# Patient Record
Sex: MALE | Race: BLACK OR AFRICAN AMERICAN | Employment: STUDENT | URBAN - METROPOLITAN AREA
[De-identification: names, ages, dates, MRNs, and addresses within clinical notes are randomized per-mention and may not be internally consistent; named-entity substitution may affect disease eponyms.]

---

## 2017-04-06 ENCOUNTER — OFFICE VISIT (OUTPATIENT)
Dept: ORTHOPEDIC SURGERY | Age: 22
End: 2017-04-06

## 2017-04-06 VITALS
HEART RATE: 79 BPM | TEMPERATURE: 97 F | HEIGHT: 74 IN | SYSTOLIC BLOOD PRESSURE: 147 MMHG | DIASTOLIC BLOOD PRESSURE: 80 MMHG

## 2017-04-06 DIAGNOSIS — S86.011A ACHILLES TENDON RUPTURE, RIGHT, INITIAL ENCOUNTER: Primary | ICD-10-CM

## 2017-04-06 NOTE — PROGRESS NOTES
Patient states he was playing basketball and felt a pop. He presents today withx-rays from [facility].

## 2017-04-06 NOTE — PATIENT INSTRUCTIONS
Follow up after MRI   Discussed surgery  Placed into a short       Achilles Tendon Repair: Before Your Surgery  What is an Achilles tendon repair? Achilles tendon repair reconnects the ends of the broken tendon. This lets you use your foot again in a normal way. You could have one of two types of surgery. In open surgery, the doctor makes a cut at the back of your leg. This cut is called an incision. In percutaneous (say \"per-colten-LUPE-ferdinand-us\") surgery, the doctor uses a few smaller cuts. Tools for fixing the tendon are inserted through the cuts. Your doctor will decide which surgery will work best for you. Most people go home the same day as the surgery. How soon you can go back to work and your normal routine depends on your job. If you sit at work, you may be able to go back in 1 to 2 weeks. But if you are on your feet at work, it may take 6 to 8 weeks. If you are very physically active in your job, it may take 3 to 6 months. You will be in a cast or walking boot for 6 to 12 weeks after surgery. You may need physical therapy. Most people can return to sports in 4 to 6 months. Follow-up care is a key part of your treatment and safety. Be sure to make and go to all appointments, and call your doctor if you are having problems. It's also a good idea to know your test results and keep a list of the medicines you take. What happens before surgery? Surgery can be stressful. This information will help you understand what you can expect. And it will help you safely prepare for surgery. Preparing for surgery  · Understand exactly what surgery is planned, along with the risks, benefits, and other options. · Tell your doctors ALL the medicines, vitamins, supplements, and herbal remedies you take. Some of these can increase the risk of bleeding or interact with anesthesia. · If you take blood thinners, such as warfarin (Coumadin), clopidogrel (Plavix), or aspirin, be sure to talk to your doctor.  He or she will tell you if you should stop taking these medicines before your surgery. Make sure that you understand exactly what your doctor wants you to do. · Your doctor will tell you which medicines to take or stop before your surgery. You may need to stop taking certain medicines a week or more before surgery. So talk to your doctor as soon as you can. · If you have an advance directive, let your doctor know. It may include a living will and a durable power of  for health care. Bring a copy to the hospital. If you don't have one, you may want to prepare one. It lets your doctor and loved ones know your health care wishes. Doctors advise that everyone prepare these papers before any type of surgery or procedure. What happens on the day of surgery? · Follow the instructions exactly about when to stop eating and drinking. If you don't, your surgery may be canceled. If your doctor told you to take your medicines on the day of surgery, take them with only a sip of water. · Take a bath or shower before you come in for your surgery. Do not apply lotions, perfumes, deodorants, or nail polish. · Do not shave the surgical site yourself. · Take off all jewelry and piercings. And take out contact lenses, if you wear them. At the hospital or surgery center  · Bring a picture ID. · The area for surgery is often marked to make sure there are no errors. · You will be kept comfortable and safe by your anesthesia provider. The anesthesia may make you sleep. Or it may just numb the area being worked on. · The surgery will take 1 to 2 hours. · Your leg will be bandaged and raised. You may have a drain near your incision. · Your foot and ankle will be held in a cast or walking boot to limit motion. Going home  · Be sure you have someone to drive you home. Anesthesia and pain medicine make it unsafe for you to drive. · You will be given more specific instructions about recovering from your surgery.  They will cover things like diet, wound care, follow-up care, driving, and getting back to your normal routine. When should you call your doctor? · You have questions or concerns. · You don't understand how to prepare for your surgery. · You become ill before the surgery (such as fever, flu, or a cold). · You need to reschedule or have changed your mind about having the surgery. Where can you learn more? Go to http://linh-nuha.info/. Enter M732 in the search box to learn more about \"Achilles Tendon Repair: Before Your Surgery. \"  Current as of: May 23, 2016  Content Version: 11.2  © 4433-4855 [employer]. Care instructions adapted under license by [employer] (which disclaims liability or warranty for this information). If you have questions about a medical condition or this instruction, always ask your healthcare professional. [address] any warranty or liability for your use of this information.

## 2017-04-06 NOTE — PROGRESS NOTES
AMBULATORY PROGRESS NOTE      Patient: Kirby Singletary             MRN: [id number]     SSN: [id number] There is no height or weight on file to calculate BMI. YOB: 1995     AGE: 25 y.o. EX: male    PCP: Jakob Ko, DO     IMPRESSION/DIAGNOSIS /// TREATMENT PLAN /// HPI AND EXAMINATION       DIAGNOSES  1. Achilles tendon rupture, right, initial encounter        Orders Placed This Encounter    MRI ANKLE RT WO CONT    MRI TIB/FIB RT W CONT      Chrischang Martinez understands his diagnoses and the proposed plan. My overall impression is such that the patient is a 72-year-old male who was playing basketball and has been pain and discomfort to his right hindfoot along the Achilles tendon. My impression is a right Achilles tendon rupture. PLAN:   1. MRI of his right Achilles tendon. 2. He is placed in a well padded posterior splint in plantarflexion. 3. He already had prescriptions for Motrin, as well as Norco as given to him by the ER staff at [facility], where he went to a [facility] facility yesterday, April 5, 2017. I want to see him after the MRI. I did discuss with him and his mom and dad that my impression is that he has an acute rupture of the right Achilles tendon complex. My recommendation would be for surgery, repair of the right Achilles tendon complex. We will see him early next week and assess his swelling but also talk to him further about surgery. I did talk to him in detail really about this surgery, primary repair of the Achilles tendon, and that he will be in plantarflexion for at least four to six weeks then a CAM walker boot in plantarflexion for another four weeks with some gradual range of motion and supervised physical therapy. He wants to go into the [facility] at some point. He, unfortunately, is going to have to delay this for at least six months to one year to allow him to recover from this surgery.   I did talk to them about DVT and clots that can occur after any injury to the lower extremity. After surgery, I anticipate putting him on Aspirin 325 mg one po each day. Lindsay Martinez IS A 25 y.o. male who presents to my outpatient office for evaluation of: The patient, Bonnie Martinez, is a 25 y.o. male who was playing basketball yesterday, date of injury, April 5, 2017. He was doing [employer]". What he means, he was dribbling the ball and getting ready to run and felt a pop to his right hindfoot. He had pain and discomfort and had difficulty ambulating. He went to a [facility] facility where he had x-rays and an examination. Examination clinically indicates that he has an Achilles tendon rupture, is what the emergency room staff suspected and is what I confirmed also on my examination. He arrives here in a posterior splint. His chief complaint is right hindfoot pain and the inability to weightbear due to having pain along the Achilles tendon complex. He is otherwise healthy. He has no history of Fluoroquinolone exposure. He has no other medical problems. ANKLE/FOOT right    Psychiatry: Alert, Oriented x 3; Speech normal in context and clarity,            Memory intact grossly, no involuntary movements - tremors, no dementia  Gait: uses assistive device  and nonambulatory  Tenderness: moderate tenderness achilles tendon with palpable tendinopathy, Positive Llamas's Sign  Cutaneous: mild swelling, no bruising,  Palpable void to achilles tendon present  Joint Motion: Decreased Ankle DF/PF ROM due to tenderness at achilles  Joint / Tendon Stability: Not tested or conducted due to tenderness   Alignment: neutral Hindfoot, none Metatarsus Adductus Metatarsus. Neuro Motor/Sensory: NL/NL, Vascular: NL foot/ankle pulses  Lymphatics: No extremity lymphedema,              No proximal calf swelling, no tenderness to calf muscles. CHART REVIEW     History reviewed. No pertinent past medical history.   Current Outpatient Prescriptions Medication Sig    acetaminophen (TYLENOL) 325 mg tablet Take  by mouth every four (4) hours as needed for Pain. No current facility-administered medications for this visit. No Known Allergies  Past Surgical History:   Procedure Laterality Date    HX ORTHOPAEDIC      finger    HX TONSIL AND ADENOIDECTOMY  2004     Social History     Occupational History    Not on file. Social History Main Topics    Smoking status: Never Smoker    Smokeless tobacco: Not on file    Alcohol use No    Drug use: Not on file    Sexual activity: Not on file     History reviewed. No pertinent family history. REVIEW OF SYSTEMS : 4/6/2017  ALL BELOW ARE Negative except : SEE HPI     Constitutional: Negative for fever, chills and weight loss. Neg Weight Loss  Cardiovascular: Negative for chest pain, claudication and leg swelling. SOB, CARRASCO   Gastrointestinal/Urological: Negative for pain, N/V/D/C, Blood in stool or urine,dysuria,  Hematuria, Incontinence  Musculoskeletal: see HPI. Neurological: Negative for dizziness and weakness, headaches,Visual Changes or   Confusion, or Seizures,   Psychiatric/Behavioral: Negative for depression, memory loss and substance abuse. Extremities:  Negative for hair changes, rash or skin lesion changes. Hematologic: Negative for Bleeding problems, bruising, pallor or swollen lymph nodes.   Peripheral Vascular: No calf pain, vascular vein tenderness to calf pain              No calf throbbing, posterior knee throbbing pain     DIAGNOSTIC IMAGING  [facility] : INTERPRETATION BY RADIOLOGIST  4/5/2016     ANKLE COMPLETE RT (04/06/2017 12:32 AM)  ANKLE COMPLETE RT (04/06/2017 12:32 AM)   Impressions   Impression:    --------------        No acute osseous abnormality.        Soft defect along the distal Achilles tendon possibly Achilles tendon injury/rupture.       ANKLE COMPLETE RT (04/06/2017 12:32 AM)   Narrative ---------------------------------------------------------------------------    <<<<<<<<<           [facility]           >>>>>>>>>     ---------------------------------------------------------------------------        CLINICAL HISTORY:  Trauma. Pain.        COMPARISON EXAMINATIONS:  None.            ---  THREE VIEWS OF THE RIGHT ANKLE ---        No fracture identified.  The ankle mortise is symmetric.        There is a soft tissue defect along the distal Achilles tendon.            --------------       ANKLE COMPLETE RT (04/06/2017 12:32 AM)   Procedure Note   Interface, Talkstation Rad Res - u Apr 6, 2017 12:53 AM EDT    ---------------------------------------------------------------------------  <<<<<<<<< [facility] >>>>>>>>>   ---------------------------------------------------------------------------    CLINICAL HISTORY: Trauma. Pain. COMPARISON EXAMINATIONS: None. --- THREE VIEWS OF THE RIGHT ANKLE ---    No fracture identified. The ankle mortise is symmetric. There is a soft tissue defect along the distal Achilles tendon. --------------  IMPRESSION  Impression:  --------------    No acute osseous abnormality. Soft defect along the distal Achilles tendon possibly Achilles tendon injury/rupture.                 Please see above section of this report. I have personally reviewed the results of the above study. The interpretation of this study is my professional opinion.       Anu Maurice MD  4/6/2017  1:12 PM

## 2017-04-06 NOTE — MR AVS SNAPSHOT
Visit Information Date & Time Provider Department Dept. Phone Encounter #  
 4/6/2017  1:10 PM Jose Elias Jacob MD [facility] [phone] Upcoming Health Maintenance Date Due Hepatitis A Peds Age 1-18 (1 of 2 - Standard Series) 10/2/1998 DTaP/Tdap/Td series (1 - Tdap) 10/2/2004 HPV AGE 9Y-34Y (1 of 3 - Male 3 Dose Series) 10/2/2008 INFLUENZA AGE 9 TO ADULT 8/1/2016 Allergies as of 4/6/2017  Review Complete On: 4/6/2017 By: Albaro Mcpherson No Known Allergies Current Immunizations  Never Reviewed No immunizations on file. Not reviewed this visit You Were Diagnosed With   
  
 Codes Comments Achilles tendon rupture, right, initial encounter    -  Primary ICD-10-CM: G66.255F ICD-9-CM: 845.09 Vitals BP Pulse Temp Height(growth percentile) Smoking Status 147/80 (98 %/ 53 %)* 79 97 °F (36.1 °C) (Oral) 6' 2\" (1.88 m) (94 %, Z= 1.58) Never Smoker *BP percentiles are based on NHBPEP's 4th Report Growth percentiles are based on CDC 2-20 Years data. Your Updated Medication List  
  
   
This list is accurate as of: 4/6/17  2:01 PM.  Always use your most recent med list.  
  
  
  
  
 TYLENOL 325 mg tablet Generic drug:  acetaminophen Take  by mouth every four (4) hours as needed for Pain. To-Do List   
 04/06/2017 Imaging:  MRI ANKLE RT WO CONT   
  
 04/06/2017 Imaging:  MRI HIP  RT  WO CONT Referral Information Referral ID Referred By Referred To  
  
 [id number] FRANKIE PAYNE Not Available Visits Status Start Date End Date 1 New Request 4/6/17 4/6/18 If your referral has a status of pending review or denied, additional information will be sent to support the outcome of this decision. Referral ID Referred By Referred To  
 [id number] FRANKIE PAYNE Not Available Visits Status Start Date End Date 1 New Request 4/6/17 4/6/18 If your referral has a status of pending review or denied, additional information will be sent to support the outcome of this decision. Patient Instructions Follow up after MRI Discussed surgery Placed into a short Achilles Tendon Repair: Before Your Surgery What is an Achilles tendon repair? Achilles tendon repair reconnects the ends of the broken tendon. This lets you use your foot again in a normal way. You could have one of two types of surgery. In open surgery, the doctor makes a cut at the back of your leg. This cut is called an incision. In percutaneous (say \"per-kew-LUPE-nee-us\") surgery, the doctor uses a few smaller cuts. Tools for fixing the tendon are inserted through the cuts. Your doctor will decide which surgery will work best for you. Most people go home the same day as the surgery. How soon you can go back to work and your normal routine depends on your job. If you sit at work, you may be able to go back in 1 to 2 weeks. But if you are on your feet at work, it may take 6 to 8 weeks. If you are very physically active in your job, it may take 3 to 6 months. You will be in a cast or walking boot for 6 to 12 weeks after surgery. You may need physical therapy. Most people can return to sports in 4 to 6 months. Follow-up care is a key part of your treatment and safety. Be sure to make and go to all appointments, and call your doctor if you are having problems. It's also a good idea to know your test results and keep a list of the medicines you take. What happens before surgery? Surgery can be stressful. This information will help you understand what you can expect. And it will help you safely prepare for surgery. Preparing for surgery · Understand exactly what surgery is planned, along with the risks, benefits, and other options.  
· Tell your doctors ALL the medicines, vitamins, supplements, and herbal remedies you take. Some of these can increase the risk of bleeding or interact with anesthesia. · If you take blood thinners, such as warfarin (Coumadin), clopidogrel (Plavix), or aspirin, be sure to talk to your doctor. He or she will tell you if you should stop taking these medicines before your surgery. Make sure that you understand exactly what your doctor wants you to do. · Your doctor will tell you which medicines to take or stop before your surgery. You may need to stop taking certain medicines a week or more before surgery. So talk to your doctor as soon as you can. · If you have an advance directive, let your doctor know. It may include a living will and a durable power of  for health care. Bring a copy to the hospital. If you don't have one, you may want to prepare one. It lets your doctor and loved ones know your health care wishes. Doctors advise that everyone prepare these papers before any type of surgery or procedure. What happens on the day of surgery? · Follow the instructions exactly about when to stop eating and drinking. If you don't, your surgery may be canceled. If your doctor told you to take your medicines on the day of surgery, take them with only a sip of water. · Take a bath or shower before you come in for your surgery. Do not apply lotions, perfumes, deodorants, or nail polish. · Do not shave the surgical site yourself. · Take off all jewelry and piercings. And take out contact lenses, if you wear them. At the hospital or surgery center · Bring a picture ID. · The area for surgery is often marked to make sure there are no errors. · You will be kept comfortable and safe by your anesthesia provider. The anesthesia may make you sleep. Or it may just numb the area being worked on. · The surgery will take 1 to 2 hours. · Your leg will be bandaged and raised. You may have a drain near your incision.  
· Your foot and ankle will be held in a cast or walking boot to limit motion. Going home · Be sure you have someone to drive you home. Anesthesia and pain medicine make it unsafe for you to drive. · You will be given more specific instructions about recovering from your surgery. They will cover things like diet, wound care, follow-up care, driving, and getting back to your normal routine. When should you call your doctor? · You have questions or concerns. · You don't understand how to prepare for your surgery. · You become ill before the surgery (such as fever, flu, or a cold). · You need to reschedule or have changed your mind about having the surgery. Where can you learn more? Go to http://linhArtVentive Medical Groupnuha.info/. Enter M732 in the search box to learn more about \"Achilles Tendon Repair: Before Your Surgery. \" Current as of: May 23, 2016 Content Version: 11.2 © 6973-5543 [employer]. Care instructions adapted under license by [employer] (which disclaims liability or warranty for this information). If you have questions about a medical condition or this instruction, always ask your healthcare professional. [place] [address] any warranty or liability for your use of this information. Introducing [facility]! [employer] introduces [employer] patient portal. Now you can access parts of your medical record, email your doctor's office, and request medication refills online. 1. In your internet browser, go to [email or web address] 2. Click on the First Time User? Click Here link in the Sign In box. You will see the New Member Sign Up page. 3. Enter your [employer] Access Code exactly as it appears below. You will not need to use this code after youve completed the sign-up process. If you do not sign up before the expiration date, you must request a new code. · [employer] Access Code: [email or web address] Expires: 7/5/2017  2:01 PM 
 
 4. Enter the last four digits of your Social Security Number (xxxx) and Date of Birth (mm/dd/yyyy) as indicated and click Submit. You will be taken to the next sign-up page. 5. Create a [employer] ID. This will be your [employer] login ID and cannot be changed, so think of one that is secure and easy to remember. 6. Create a [employer] password. You can change your password at any time. 7. Enter your Password Reset Question and Answer. This can be used at a later time if you forget your password. 8. Enter your e-mail address. You will receive e-mail notification when new information is available in [address] 9. Click Sign Up. You can now view and download portions of your medical record. 10. Click the Download Summary menu link to download a portable copy of your medical information. If you have questions, please visit the Frequently Asked Questions section of the [employer] website. Remember, [employer] is NOT to be used for urgent needs. For medical emergencies, dial 911. Now available from your iPhone and Android! Please provide this summary of care documentation to your next provider. Your primary care clinician is listed as Trini Dill. If you have any questions after today's visit, please call [phone].

## 2017-04-08 ENCOUNTER — HOSPITAL ENCOUNTER (OUTPATIENT)
Dept: MRI IMAGING | Age: 22
Discharge: HOME | End: 2017-04-08
Attending: ORTHOPAEDIC SURGERY
Payer: COMMERCIAL

## 2017-04-08 DIAGNOSIS — S86.011A ACHILLES TENDON RUPTURE, RIGHT, INITIAL ENCOUNTER: ICD-10-CM

## 2017-04-08 PROCEDURE — 73721 MRI JNT OF LWR EXTRE W/O DYE: CPT

## 2017-04-08 PROCEDURE — 73718 MRI LOWER EXTREMITY W/O DYE: CPT

## 2017-04-10 ENCOUNTER — HOSPITAL ENCOUNTER (OUTPATIENT)
Dept: LAB | Age: 22
Discharge: HOME | End: 2017-04-10
Payer: COMMERCIAL

## 2017-04-10 ENCOUNTER — OFFICE VISIT (OUTPATIENT)
Dept: ORTHOPEDIC SURGERY | Age: 22
End: 2017-04-10

## 2017-04-10 VITALS
SYSTOLIC BLOOD PRESSURE: 130 MMHG | HEART RATE: 74 BPM | DIASTOLIC BLOOD PRESSURE: 79 MMHG | TEMPERATURE: 97.8 F | HEIGHT: 74 IN

## 2017-04-10 DIAGNOSIS — S86.011D ACHILLES TENDON RUPTURE, RIGHT, SUBSEQUENT ENCOUNTER: Primary | ICD-10-CM

## 2017-04-10 DIAGNOSIS — Z01.818 PRE-OPERATIVE EXAMINATION: ICD-10-CM

## 2017-04-10 PROCEDURE — 80307 DRUG TEST PRSMV CHEM ANLYZR: CPT | Performed by: ORTHOPAEDIC SURGERY

## 2017-04-10 PROCEDURE — 80361 OPIATES 1 OR MORE: CPT | Performed by: ORTHOPAEDIC SURGERY

## 2017-04-10 RX ORDER — PROMETHAZINE HYDROCHLORIDE 25 MG/1
25 TABLET ORAL
Qty: 30 TAB | Refills: 0 | Status: SHIPPED | OUTPATIENT
Start: 2017-04-10

## 2017-04-10 RX ORDER — ASPIRIN 325 MG
325 TABLET ORAL DAILY
Qty: 30 TAB | Refills: 0 | Status: SHIPPED | OUTPATIENT
Start: 2017-04-10

## 2017-04-10 RX ORDER — IBUPROFEN 800 MG/1
TABLET ORAL
COMMUNITY
Start: 2017-04-06 | End: 2017-04-10

## 2017-04-10 RX ORDER — HYDROCODONE BITARTRATE AND ACETAMINOPHEN 5; 325 MG/1; MG/1
TABLET ORAL
COMMUNITY
Start: 2017-04-06

## 2017-04-10 RX ORDER — OXYCODONE AND ACETAMINOPHEN 7.5; 325 MG/1; MG/1
TABLET ORAL
Qty: 50 TAB | Refills: 0 | Status: SHIPPED | OUTPATIENT
Start: 2017-04-10

## 2017-04-10 RX ORDER — POLYETHYLENE GLYCOL 3350 17 G/17G
17 POWDER, FOR SOLUTION ORAL DAILY
Qty: 10 PACKET | Refills: 1 | Status: SHIPPED | OUTPATIENT
Start: 2017-04-10

## 2017-04-10 NOTE — PATIENT INSTRUCTIONS
Dr. Bev Null Pre-operative Instructions:      Patient: Sea Felton   :  1995     I understand I am to stop taking all Aspirin, Aspirin containing medications, Non-Steroidal Anti-Inflammatory medications (such as Advil, Aleve, Motrin, Ibuprofen) and or Blood thinner medication such as Coumadin, Plavix, Heparin or others 5 days prior to surgery. I understand I am to STOP taking these Medications 5 days prior to surgery:  I am to get instructions from my prescribing physician. 1. __As listed above_______________________  2. _____________________________________  3. _____________________________________  4. _____________________________________    I understand that if I am taking daily medications for high blood pressure, I can take them the morning of surgery with a small sip of water. I will consult my prescribing physician or call [facility] with specific questions. I also understand that:     I am to report important observations or changes that may occur prior to surgery. If I have any changes in my physical condition, such as a rash, a fever, sore throat, abscess, ulcers, nausea, vomiting, or diarrhea. I am to call the office and I am to consult my primary care physician to assess and treat the problem.  I am not to eat or drink anything after midnight the night before my surgery.  I am not to drink alcoholic beverages 24 hours prior to surgery.  I am not to do any illegal drugs prior to surgery.  I am not to smoke at least 24 hours prior to surgery.  I am able and will shower or bathe before surgery. I will use the Hibiclens solution on my surgical site only. The hibiclens directions are one packet a day starting two days before surgery.  I will remove any nail polish, make-up or jewelry prior to arriving for my surgery.  If I wear glasses, contact lenses or dentures they must be removed prior to going to the operating room.      All body piercing and artifical eye-lashes must be removed prior to surgery     I will not wear any aerosol sprays, perfumes or skin creams.  I am to make arrangements for a family member or friend to accompany me to surgery and take me home after my surgery as I will not be allowed to leave the hospital alone. A cab or bus will not be acceptable. Please make arrange for someone to stay with you for 24 hours after surgery.  Patient has expressed understanding of the diagnosis, treatment and planned surgery      Order provided for [facility] knee Crutch            Achilles Tendon Tear: Care Instructions  Your Care Instructions    You have ruptured or torn your Achilles tendon. The Achilles tendon (also called the heel cord) connects the calf muscles on the back of the lower leg to the bone at the base of the heel. Treatment for an Achilles tendon injury depends on whether the tendon has been partially torn or completely ruptured. A cast or splint can often treat a partial tear. If your tendon has ruptured, you may need surgery. You and your orthopedic doctor will choose a treatment plan, so it is important to go to any follow-up appointments. Follow-up care is a key part of your treatment and safety. Be sure to make and go to all appointments, and call your doctor if you are having problems. Its also a good idea to know your test results and keep a list of the medicines you take. How can you care for yourself at home? · Prop up the sore foot on a pillow anytime you sit or lie down during the next 3 days. Try to keep it above the level of your heart. This will help reduce swelling. · Take pain medicines exactly as directed. ¨ If the doctor gave you a prescription medicine for pain, take it as prescribed. ¨ If you are not taking a prescription pain medicine, ask your doctor if you can take an over-the-counter medicine. · Do not put weight on the affected foot until your doctor says you can. Use crutches or a walker.   · Wear the splint or cast as directed until your doctor says you can remove it. When should you call for help? Call 911 anytime you think you may need emergency care. For example, call if:  · You have sudden chest pain and shortness of breath, or you cough up blood. Call your doctor now or seek immediate medical care if:  · You have increased or severe pain. · Your foot is cool or pale or changes color. · You have tingling, weakness, or numbness in your toes. · Your cast or splint feels too tight. · You cannot move your toes. · You have a fever, or there is drainage or a bad smell coming from the cast.  · You have signs of a blood clot, such as:  ¨ Pain in your calf, back of the knee, thigh, or groin. ¨ Redness or swelling in your leg. · The skin under your cast or splint burns or stings. Watch closely for changes in your health, and be sure to contact your doctor if:  · You do not get better as expected. Where can you learn more? Go to http://linh-nuha.info/. Enter F495 in the search box to learn more about \"Achilles Tendon Tear: Care Instructions. \"  Current as of: May 23, 2016  Content Version: 11.2  © 7399-7125 [employer]. Care instructions adapted under license by [employer] (which disclaims liability or warranty for this information). If you have questions about a medical condition or this instruction, always ask your healthcare professional. [place] [address] any warranty or liability for your use of this information. Surgery: What to Expect at Home  Your Recovery  This care sheet gives you a general idea about how long it will take for you to recover from your surgery. But each person recovers at a different pace. How can you care for yourself at home? Activity  · Allow your body to heal. Don't move quickly or lift anything heavy until you are feeling better. · Rest when you feel tired.   · Your doctor may give you specific instructions on when you can do your normal activities again, such as driving and going back to work. · Be active. Walking is a good choice. Diet  · You can eat your normal diet when you feel well. If your stomach is upset, try bland, low-fat foods like plain rice, broiled chicken, toast, and yogurt. · If your bowel movements are not regular right after surgery, try to avoid constipation and straining. Drink plenty of water. Your doctor may suggest fiber, a stool softener, or a mild laxative. Medicines  · Your doctor will tell you if and when you can restart your medicines. He or she will also give you instructions about taking any new medicines. · If you take blood thinners, such as warfarin (Coumadin), clopidogrel (Plavix), or aspirin, be sure to talk to your doctor. He or she will tell you if and when to start taking those medicines again. Make sure that you understand exactly what your doctor wants you to do. · Be safe with medicines. Read and follow all instructions on the label. ¨ If the doctor gave you a prescription medicine for pain, take it as prescribed. ¨ If you are not taking a prescription pain medicine, ask your doctor if you can take an over-the-counter medicine. Incision care  · You will have a dressing over the cut (incision). A dressing helps the incision heal and protects it. Your doctor will tell you how to take care of this. · If you have strips of tape on the cut the doctor made, leave the tape on for a week or until it falls off. · If you had stitches, your doctor will tell you when to come back to have them removed. · If you have skin adhesive on the cut, leave it on until it falls off. Skin adhesive is also called liquid stitches. · Change the bandage every day. · Wash the area daily with warm, soapy water, and pat it dry. Don't use hydrogen peroxide or alcohol. They can slow healing.   · You may cover the area with a gauze bandage if it oozes fluid or rubs against clothing. · You may shower 24 to 48 hours after surgery. Pat the incision dry. Don't swim or take a bath for the first 2 weeks, or until your doctor tells you it is okay. Follow-up care is a key part of your treatment and safety. Be sure to make and go to all appointments, and call your doctor if you are having problems. It's also a good idea to know your test results and keep a list of the medicines you take. When should you call for help? Call 911 anytime you think you may need emergency care. For example, call if:  · You passed out (lost consciousness). · You have severe trouble breathing. · You have sudden chest pain and shortness of breath, or you cough up blood. Call your doctor now or seek immediate medical care if:  · You have pain that does not get better after you take pain medicine. · You have loose stitches, or your incision comes open. · You are bleeding through your dressing. A small amount of blood is normal.  · You have signs of infection, such as:  ¨ Increased pain, swelling, warmth, or redness. ¨ Red streaks leading from the incision. ¨ Pus draining from the incision. ¨ A fever. · You have symptoms of a blood clot in your arm or leg (called a deep vein thrombosis). These may include:  ¨ Pain in your calf, back of the knee, thigh, or groin. ¨ Redness and swelling in the arm, leg, or groin. Watch closely for any changes in your health, and be sure to contact your doctor if:  · You do not have a bowel movement after taking a laxative. Where can you learn more? Go to http://linh-nuha.info/  Enter W012 in the search box to learn more about \"Surgery: What to Expect at Home. \"  © 8436-3961 [employer]. Care instructions adapted under license by [employer] (which disclaims liability or warranty for this information).  This care instruction is for use with your licensed healthcare professional. If you have questions about a medical condition or this instruction, always ask your healthcare professional. [place] [address] any warranty or liability for your use of this information.   Content Version: 77.1.793393; Current as of: November 20, 2015

## 2017-04-10 NOTE — H&P
FOOT AND ANKLE HISTORY AND PHYSICAL      Patient: Figueroa Vargas                   MRN: [id number]         SSN: [id number]  YOB: 1995                    AGE: 25 y.o. SEX: male    Patient scheduled for:  Primary repair of right Achilles tendon   Date of surgery: 4/13/17   Location of Surgery: [address] [place] [address] at [facility]  Surgeon: Valeri Perez. MD [place]  ANESTHESIA TYPE:  General, Popliteal block          PRESCRIPTIONS AND/OR ORDERS PROVIDED DURING H&P:    Orders Placed This Encounter    Generic Supply Order    DRUG SCREEN UR - W/ CONFIRM    HYDROcodone-acetaminophen (NORCO) 5-325 mg per tablet    DISCONTD: ibuprofen (MOTRIN) 800 mg tablet    oxyCODONE-acetaminophen (PERCOCET) 7.5-325 mg per tablet    aspirin (ASPIRIN) 325 mg tablet    promethazine (PHENERGAN) 25 mg tablet    polyethylene glycol (MIRALAX) 17 gram packet              HISTORY:     The patient was seen in the office today for a preoperative history and physical for an upcoming above listed surgery. The patient is a pleasant 25 y.o. male who has a history of right Achilles' tendon rupture. Patient was playing basketball, doing [employer]" (i.e. He was dribbling the ball and getting ready to run) when felt a pop to his right hindfoot. The date of  Injury was April 5, 2017. He had pain and discomfort and had difficulty ambulating. He went to a [facility] facility where he had x-rays and an examination that indicated clinically that he sustained an Achilles tendon rupture. His chief complaint is right hindfoot pain and the inability to weightbear due to having pain along the Achilles tendon complex. He is otherwise healthy. He has no history of Fluoroquinolone exposure. He has no other medical problems.      MRI of his right Achilles tendon on 4/8/17 revealed a high grade near complete tear of the Achilles tendon, probably a complete tear equivalent.     Due to the current findings, affected activity of daily living and continued pain and discomfort, surgical intervention is indicated. The alternatives, risks, and complications, including but not limited to infection, blood loss, need for blood transfusion, neurovascular damage, brandin-incisional numbness, subcutaneous hematoma, bone fracture, anesthetic complications, DVT, PE, death, RSD, postoperative stiffness and pain, possible surgical scar, delayed healing and nonhealing, reflexive sympathetic dystrophy, damage to blood vessels and nerves, need for more surgery, MI, and stroke have been discussed. The patient understands and wishes to proceed with surgery. Dr. Charolet Cheadle spoke with the patient and his parents in detail regarding the proposed surgery for a primary repair of the Achilles tendon. Patient can expect to be in plantarflexion for at least four to six weeks then a CAM walker boot in plantarflexion for another four weeks with some gradual range of motion and supervised physical therapy. He wants to go into the [employer] at some point. He, unfortunately, is going to have to delay this for at least six months to one year to allow him to recover from this surgery. Dr. Charolet Cheadle spoke to him about the risk of DVT and blood clots that can occur after any injury to the lower extremity. After surgery, we anticipate putting him on Aspirin 325 mg one po each day.      PAST MEDICAL HISTORY:     Past Medical History:   Diagnosis Date    Orthodontics braces       CURRENT MEDICATIONS:     Current Outpatient Prescriptions   Medication Sig Dispense Refill    HYDROcodone-acetaminophen (NORCO) 5-325 mg per tablet       oxyCODONE-acetaminophen (PERCOCET) 7.5-325 mg per tablet TAKE ONE TO TWO TABLETS BY MOUTH Q 4 - 6 HRS AS NEEDED FOR PAIN **AFTER SURGERY** DO NOT TAKE BEFORE SURGERY  Indications: Pain 50 Tab 0    aspirin (ASPIRIN) 325 mg tablet Take 1 Tab by mouth daily.  **START TAKING AFTER SURGERY** 30 Tab 0    promethazine (PHENERGAN) 25 mg tablet Take 1 Tab by mouth every six (6) hours as needed for Nausea. 30 Tab 0    polyethylene glycol (MIRALAX) 17 gram packet Take 1 Packet by mouth daily. 10 Packet 1    acetaminophen (TYLENOL) 325 mg tablet Take  by mouth every four (4) hours as needed for Pain. ALLERGIES:     No Known Allergies      SURGICAL HISTORY:     Past Surgical History:   Procedure Laterality Date    HAND/FINGER SURGERY UNLISTED      HX ORTHOPAEDIC      finger    HX TONSIL AND ADENOIDECTOMY  2004       SOCIAL HISTORY:     Social History     Social History    Marital status: SINGLE     Spouse name: N/A    Number of children: N/A    Years of education: N/A     Social History Main Topics    Smoking status: Never Smoker    Smokeless tobacco: None    Alcohol use Yes      Comment: ocassional     Drug use: Yes     Special: Marijuana      Comment: last use was 3/10/17    Sexual activity: Not Asked     Other Topics Concern    None     Social History Narrative       FAMILY HISTORY:     Family History   Problem Relation Age of Onset    Diabetes Maternal Grandmother        REVIEW OF SYSTEMS:     Negative for fevers, chills, chest pain, shortness of breath, weight loss, recent illness     General: Negative for fever and chills. No unexpected change in weight. Denies fatigue. No change in appetite. Skin: Negative for rash or itching. HEENT: Negative for congestion, sore throat, neck pain and neck stiffness. No change in vision or hearing. Hasn't noted any enlarged lymph nodes in the neck. Cardiovascular:  Negative for chest pain and palpitations. Has not noted pedal edema. Respiratory: Negative for cough, colds, sinus, hemoptysis, shortness of breath and wheezing. Gastrointestinal: Negative for nausea and vomiting, rectal bleeding, coffee ground emesis, abdominal pain, diarrhea and constipation. Genitourinary: Negative for dysuria, frequency urgency, or burning on micturition.  No flank pain, no foul smelling urine, no difficulty with initiating urination. Hematological: Negative for bleeding or easy bruising. Musculoskeletal: Negative  for arthralgias, back pain or neck pain. Neurological: Negative for dizziness, seizures or syncopal episodes. Denies headaches. Endocrine: Denies excessive thirst.  No heat/cold intolerance. Psychiatric: Negative for depression or insomnia. PHYSICAL EXAMINATION:     VITALS:   Visit Vitals    /79    Pulse 74    Temp 97.8 °F (36.6 °C) (Oral)    Ht 6' 2\" (1.88 m)       GEN:  Well developed, well nourished 25 y.o. male in no acute distress. PSYCH: Alert an oriented to person, place and time. Mood, memory, affect, behavior and judgment normal  HEENT: Normocephalic and atraumatic. Eyes: Conjunctivae and EOM are normal.Pupils are equal, round, and reactive to light. External ear normal appearance, external nose normal appearing. Mouth/Throat: Oropharynx is clear and moist, able to handle oral secretions w/out difficulty, airway patent  NECK: Supple. Normal ROM, No lymphadenopathy. Trachea is midline. No bruising, swelling or deformity  RESP: Clear to auscultation bilaterally. No wheezes, rales, rhonchi. Normal effort and breath sounds. No respiratory distress  CARDIO: Normal rate, regular rhythm and normal heart sounds. No MGR. ABDOMEN: Soft, non-tender, non-distended, normoactive bowel sounds in all four quadrants. There is no tenderness. There is no rebound and no guarding.    BACK: No CVA or spinal tenderness  BREAST:  Deferred  PELVIC:    Deferred   RECTAL:  Deferred   :           Deferred  EXTREMITIES: EXAMINATION OF: ANKLE/FOOT right     Psychiatry: Alert, Oriented x 3; Speech normal in context and clarity,   Memory intact grossly, no involuntary movements - tremors, no dementia  Gait: uses assistive device and nonambulatory  Tenderness: moderate tenderness achilles tendon with palpable tendinopathy, Positive Llamas's Sign  Cutaneous: mild swelling, no bruising,  Palpable void to achilles tendon present  Joint Motion: Decreased Ankle DF/PF ROM due to tenderness at achilles  Joint / Tendon Stability: Not tested or conducted due to tenderness   Alignment: neutral Hindfoot, none Metatarsus Adductus Metatarsus. Neuro Motor/Sensory: NL/NL, Vascular: NL foot/ankle pulses  Lymphatics: No extremity lymphedema,   No proximal calf swelling, no tenderness to calf muscles. RADIOGRAPHS & DIAGNOSTIC STUDIES:     MR Right Ankle without Contrast. 4/8/17     INDICATION: Felt a pop in the heel while playing basketball (4/5/17).     COMPARISON: None.     TECHNIQUE: Sagittal T1, fat-sat FSE T2; coronal T1, fat-sat FSE T2, axial T1,  and fat-sat FSE T2 images of the right ankle are obtained without  gadolinium-based contrast administration.     FINDINGS:     Posterior and Lateral Tendons:     a. Achilles tendon:      - Abnormal Achilles tendon thickening with patchy increased T2 signal.  - At approximately 7 cm above Achilles tendon insertion site proximal margin, an  abnormal focus of marked Achilles tendon attenuation is observed with irregular  wavy contour. This appearance is highly suspicious for high-grade near complete  of the Achilles tendon.     b. Peroneal Tendons: Intact.     Medial and Dorsal Tendons:      a. Posterior Tibialis Tendon: Abnormally thickened. Subtle T2 high signal gap  is suggested at the medial aspect of the navicula, suggestive of PT tendon tear. Alternative explanation for this finding could be that there is an accessory  ossicle, i.e. os naviculare or os tibiale externum, which has not ossified or  has edema, and therefore is seen as an abnormal low signal bundle within the  tendon sheath simulating marked focal thickening.     b. Flexor Digitorum Longus Tendon: Intact.     c. Flexor Hallucis Longus Tendon: Intact.     d.  Anterior Tibialis Tendon: Intact.     e. Extensor Hallucis Longus Tendon: Intact.     f. Extensor Digitorum Longus Tendon: Intact.     Lateral Ankle Ligaments:    a. Anterior Talofibular Ligament: Intact.     b. Calcaneofibular Ligament: Intact.     c. Anterior Inferior Tibiofibular Ligament: Intact.     d. Posterior Talofibular Ligament: Intact.     Medial Ankle Ligaments:      a. Deltoid Ligament Complex: Intact.     b. Spring Ligament Complex: Intact.     Tarsal Canal-Sinus Tarsi: Unremarkable. No abnormal fluid or ganglion.      Intrinsic Muscles of the Foot: Unremarkable.     Plantar Fascia: Intact.     Osseous Components: No definite focal area of marrow edema.       IMPRESSION:     1. High grade near complete tear of the Achilles tendon, probably a complete  tear equivalent.     2. Atypical appearance of the distal posterior tibial tendon. Either a  complete tear with marked underlying tendinopathy vs. simulated abnormal  findings related to atypical accessory ossicle. Correlation with either plain  films or CT may be helpful for further delineation if clinical exam is equivocal  for PT tear.         LABS:     UDS PENDING     ASSESSMENT:       Encounter Diagnoses   Name Primary?  Achilles tendon rupture, right, subsequent encounter Yes    Pre-operative examination        PLAN:     Again, the alternatives, risks, and complications, as well as expected outcome were discussed. The patient understands and agrees to proceed with the above listed surgery pending results of urine drug screen. Patient has been given Hibiclens wash with instructions and prescriptions and or orders listed above.     Roni De La Rosa PA-C  4/10/2017  3:29 PM

## 2017-04-10 NOTE — PROGRESS NOTES
AMBULATORY PROGRESS NOTE      Patient: Park Brandt             MRN: [id number]     SSN: [id number] There is no height or weight on file to calculate BMI. YOB: 1995     AGE: 25 y.o. EX: male    PCP: Martha Hua DO    IMPRESSION/DIAGNOSIS AND TREATMENT PLAN     DIAGNOSES  No diagnosis found. No orders of the defined types were placed in this encounter. Park Brandt understands his diagnoses and the proposed plan. Plan:    1)  2)    RTO     HPI AND EXAMINATION     Asherkati Martinez IS A 25 y.o. male who presents to my outpatient office for follow up of a right Achilles' tendon rupture. At last visit, I planned to get a MRI of the right Achilles' tendon, placed the patient in a well padded posterior splint in plantar flexion position, and continue ED pain medication that was prescribed. The patient presents to the office today    ANKLE/FOOT right     Psychiatry: Alert, Oriented x 3; Speech normal in context and clarity,    Memory intact grossly, no involuntary movements - tremors, no dementia  Gait: uses assistive device and nonambulatory  Tenderness: moderate tenderness achilles tendon with palpable tendinopathy, Positive Llamas's Sign  Cutaneous: mild swelling, no bruising,  Palpable void to achilles tendon present  Joint Motion: Decreased Ankle DF/PF ROM due to tenderness at achilles  Joint / Tendon Stability: Not tested or conducted due to tenderness   Alignment: neutral Hindfoot, none Metatarsus Adductus Metatarsus. Neuro Motor/Sensory: NL/NL  Vascular: NL foot/ankle pulses  Lymphatics: No extremity lymphedema,   No proximal calf swelling, no tenderness to calf muscles. CHART REVIEW     History reviewed. No pertinent past medical history.   Current Outpatient Prescriptions   Medication Sig    HYDROcodone-acetaminophen (NORCO) 5-325 mg per tablet     ibuprofen (MOTRIN) 800 mg tablet     acetaminophen (TYLENOL) 325 mg tablet Take  by mouth every four (4) hours as needed for Pain. No current facility-administered medications for this visit. No Known Allergies  Past Surgical History:   Procedure Laterality Date    HAND/FINGER SURGERY UNLISTED      HX ORTHOPAEDIC      finger    HX TONSIL AND ADENOIDECTOMY  2004     Social History     Occupational History    Not on file. Social History Main Topics    Smoking status: Never Smoker    Smokeless tobacco: Not on file    Alcohol use No    Drug use: No    Sexual activity: Not on file     History reviewed. No pertinent family history. REVIEW OF SYSTEMS : 4/10/2017  ALL BELOW ARE Negative except : SEE HPI       Constitutional: Negative for fever, chills and weight loss. Neg Weigh Loss  Cardiovascular: Negative for chest pain, claudication and leg swelling. SOB, CARRASCO   Gastrointestinal: Negative for  pain, N/V/D/C, Blood in stool or urine,dysuria, hematuria,        Incontinence, pelvic pain  Musculoskeletal: see HPI. Neurological: Negative for dizziness and weakness. Negative for headaches,Visual Changes, Confusion, Seizures,   Psychiatric/Behavioral: Negative for depression, memory loss and substance abuse. Extremities:  Negative for  hair changes, rash or skin lesion changes. Hematologic: Negative for Bleeding problems, bruising, pallor or swollen lymph nodes. Peripheral Vascular: No calf pain, vascular vein tenderness to calf pain              No calf throbbing, posterior knee throbbing pain    DIAGNOSTIC IMAGING     Study Result   PROCEDURE: MR Right Tibia and Fibula without Contrast.     INDICATION: Felt a pop in the heel while playing basketball (4/5/17).     COMPARISON: No plain films available for comparison. MR of the right ankle  performed concurrently.     TECHNIQUE: Triplanar multisequence MR imaging of the right tibia and fibula  including the calf is performed.     Axial: T1, fat sat FSE T2. Coronal: T1, fat sat FSE T2.   Sagittal: T1, fat sat FSE T2.     FINDINGS:     Please note that without Achilles tendon is not visualized in its entirety on  current scan.     In the distal Achilles tendon approximately 7 cm above the tendon insertion, an  abnormal zone of increased T2 signal with paucity of normal low signal band is  demonstrated. Below the level of the abnormal Achilles tendon signal, the  Achilles tendon appears rounded in contour and is abnormally thickened. This is  accompanied by abnormal T2 high signal changes in the soleus muscle.     The tibia and fibula demonstrate no definite abnormal marrow signal alteration. No periosteal reaction.     IMPRESSION  IMPRESSION:     1. Abnormal signal changes involving the Achilles tendon suspicious for  high-grade near-complete tear.     2. Soleus muscle strain.     Note: Please refer to the MR right ankle report for the more detailed  discussion of the Achilles tendon morphology. Study Result   PROCEDURE: MR Right Ankle without Contrast.     INDICATION: Felt a pop in the heel while playing basketball (4/5/17).     COMPARISON: None.     TECHNIQUE: Sagittal T1, fat-sat FSE T2; coronal T1, fat-sat FSE T2, axial T1,  and fat-sat FSE T2 images of the right ankle are obtained without  gadolinium-based contrast administration.     FINDINGS:     Posterior and Lateral Tendons:     a. Achilles tendon:      - Abnormal Achilles tendon thickening with patchy increased T2 signal.  - At approximately 7 cm above Achilles tendon insertion site proximal margin, an  abnormal focus of marked Achilles tendon attenuation is observed with irregular  wavy contour. This appearance is highly suspicious for high-grade near complete  of the Achilles tendon.     b. Peroneal Tendons: Intact.     Medial and Dorsal Tendons:      a. Posterior Tibialis Tendon: Abnormally thickened. Subtle T2 high signal gap  is suggested at the medial aspect of the navicula, suggestive of PT tendon tear.   Alternative explanation for this finding could be that there is an accessory  ossicle, i.e. os naviculare or os tibiale externum, which has not ossified or  has edema, and therefore is seen as an abnormal low signal bundle within the  tendon sheath simulating marked focal thickening.     b. Flexor Digitorum Longus Tendon: Intact.     c. Flexor Hallucis Longus Tendon: Intact.     d. Anterior Tibialis Tendon: Intact.     e. Extensor Hallucis Longus Tendon: Intact.     f. Extensor Digitorum Longus Tendon: Intact.     Lateral Ankle Ligaments:      a. Anterior Talofibular Ligament: Intact.     b. Calcaneofibular Ligament: Intact.     c. Anterior Inferior Tibiofibular Ligament: Intact.     d. Posterior Talofibular Ligament: Intact.     Medial Ankle Ligaments:      a. Deltoid Ligament Complex: Intact.     b. Spring Ligament Complex: Intact.     Tarsal Canal-Sinus Tarsi: Unremarkable. No abnormal fluid or ganglion.      Intrinsic Muscles of the Foot: Unremarkable.     Plantar Fascia: Intact.     Osseous Components: No definite focal area of marrow edema.     IMPRESSION  IMPRESSION:     1. High grade near complete tear of the Achilles tendon, probably a complete  tear equivalent.     2. Atypical appearance of the distal posterior tibial tendon. Either a  complete tear with marked underlying tendinopathy vs. simulated abnormal  findings related to atypical accessory ossicle. Correlation with either plain  films or CT may be helpful for further delineation if clinical exam is equivocal  for PT tear. Written by Saul Ko, as dictated by Mitchael Dakin, MD. I, Dr., Mitchael Dakin, MD, confirm that all documentation is accurate.

## 2017-04-10 NOTE — PROGRESS NOTES
AMBULATORY PROGRESS NOTE      Patient: Nate Carrillo             MRN: [id number]     SSN: [id number] There is no height or weight on file to calculate BMI. YOB: 1995     AGE: 25 y.o. EX: male    PCP: Lowell Betancur, DO     IMPRESSION/DIAGNOSIS /// TREATMENT PLAN /// HPI AND EXAMINATION       DIAGNOSES  1. Achilles tendon rupture, right, subsequent encounter    2. Pre-operative examination        Orders Placed This Encounter    Generic Supply Order    DRUG SCREEN UR - W/ CONFIRM    oxyCODONE-acetaminophen (PERCOCET) 7.5-325 mg per tablet    aspirin (ASPIRIN) 325 mg tablet    promethazine (PHENERGAN) 25 mg tablet    polyethylene glycol (MIRALAX) 17 gram packet      Nate Carrillo understands his diagnoses and the proposed plan. Munira Martinez IS A 25 y.o. male who presents to my outpatient office for evaluation of:     I had a lengthy discussion with Mr. Jyotsna Lou' dad. He does have an Achilles tendon rupture on the right side. The MRI does confirm this. In this young individual who is healthy, who engages in sports, recommendation is for primary repair of the Achilles tendon. He has consented for primary repair of the Achilles tendon with possible collagenous augmentation. The risks of surgery were explained in full detail. These risks include, but are not limited to, bleeding, infection, scar, leg clots, lung clots, Achilles tendon weakness. His dad voices his understanding of the risks of surgery and an informed decision was made to proceed. I did also talk to him about RSD, possible sural neuritis. Both individuals understand he will require formal physical therapy. He will be in a plantarflexion cast for at least four to six weeks and then be gradually brought up into neutral.  Hopefully, we can get him walking in between week six and week eight in a tall weightbearing without wedges. He will require, again, supervised formal physical therapy.   He will not be able to run for at least six months, but we will gradually get him going in terms of the elliptical , stationary bike. So, we will get him into a physical therapy program designed for Achilles tendon ruptures. All of their questions were answered to his satisfaction. CHART REVIEW     Past Medical History:   Diagnosis Date    Orthodontics braces     Current Outpatient Prescriptions   Medication Sig    HYDROcodone-acetaminophen (NORCO) 5-325 mg per tablet     oxyCODONE-acetaminophen (PERCOCET) 7.5-325 mg per tablet TAKE ONE TO TWO TABLETS BY MOUTH Q 4 - 6 HRS AS NEEDED FOR PAIN **AFTER SURGERY** DO NOT TAKE BEFORE SURGERY  Indications: Pain    aspirin (ASPIRIN) 325 mg tablet Take 1 Tab by mouth daily. **START TAKING AFTER SURGERY**    promethazine (PHENERGAN) 25 mg tablet Take 1 Tab by mouth every six (6) hours as needed for Nausea.  polyethylene glycol (MIRALAX) 17 gram packet Take 1 Packet by mouth daily.  acetaminophen (TYLENOL) 325 mg tablet Take  by mouth every four (4) hours as needed for Pain. No current facility-administered medications for this visit. No Known Allergies  Past Surgical History:   Procedure Laterality Date    HAND/FINGER SURGERY UNLISTED      HX ORTHOPAEDIC      finger    HX TONSIL AND ADENOIDECTOMY  2004     Social History     Occupational History    Not on file. Social History Main Topics    Smoking status: Never Smoker    Smokeless tobacco: Not on file    Alcohol use Yes      Comment: ocassional     Drug use: Yes     Special: Marijuana      Comment: last use was 3/10/17    Sexual activity: Not on file     Family History   Problem Relation Age of Onset    Diabetes Maternal Grandmother         REVIEW OF SYSTEMS : 4/10/2017  ALL BELOW ARE Negative except : SEE HPI     Constitutional: Negative for fever, chills and weight loss. Neg Weight Loss  Cardiovascular: Negative for chest pain, claudication and leg swelling.  SOB, CARRASCO   Gastrointestinal/Urological: Negative for pain, N/V/D/C, Blood in stool or urine,dysuria,  Hematuria, Incontinence  Musculoskeletal: see HPI. Neurological: Negative for dizziness and weakness, headaches,Visual Changes or   Confusion, or Seizures,   Psychiatric/Behavioral: Negative for depression, memory loss and substance abuse. Extremities:  Negative for hair changes, rash or skin lesion changes. Hematologic: Negative for Bleeding problems, bruising, pallor or swollen lymph nodes. Peripheral Vascular: No calf pain, vascular vein tenderness to calf pain              No calf throbbing, posterior knee throbbing pain     DIAGNOSTIC IMAGING     Please see above section of this report. I have personally reviewed the results of the above study. The interpretation of this study is my professional opinion.       Steve Aparicio MD  4/10/2017  5:27 PM

## 2017-04-10 NOTE — LETTER
Patient: Marie Ren PROCEDURE: Right Achilles tendon Repair PRE OPERATIVE INSTRUCTIONS: 
Five (5) days prior to surgery STOP taking aspirin and/or anti-inflammatory medications. If you are taking blood thinner medication (such as Coumadin, Plavix, Heparin or others) you will need special  instructions from the prescribing physician. Surgery Date: 4/13/17  Time: 7:30am 
Report to [address] at: 6:00am 
 
THE DAY OF SURGERY:  
      1. Do not eat, chew gum or drink anything after Midnight prior to the date of your surgery. 2. Take your regular medications with small sips of water unless otherwise instructed. (This means blood pressure and/or heart medicine) If you are insulin dependent, bring your insulin with you, unless otherwise instructed. 3. Bring a list of your medications and the dosage to the hospital including  vitamins. 4. Do not wear nail polish, make-up, jewelry, perfumes or Skin Creams. 5. Do not bring valuables or money to the hospital. 
      6. Must have a responsible adult to accompany you and stay during your surgery so they may drive you home following your surgery and stay with you 24 hours after surgery. Post op visit  appointment is scheduled with Felix WADE   
   on 4/19/17 @ 2:30pm  at the  [facility] office. Matilda Atkins, Surgery Scheduler  [phone]

## 2017-04-10 NOTE — MR AVS SNAPSHOT
Visit Information Date & Time Provider Department Dept. Phone Encounter #  
 4/10/2017  2:30 PM Devyn Palacios, [address] [facility] [phone] [id number] Follow-up Instructions Return in about 9 days (around 4/19/2017) for post surgical evaluation. Your Appointments 4/19/2017  2:30 PM  
POST OP with Zuleyka Sinclair PA-C  
[address] [facility], [address] and [address] [facility] ([facility]) Appt Note: Right Achilles tendon repair [address], [address] 200 [facility]  
[phone] [address] [facility] Upcoming Health Maintenance Date Due Hepatitis A Peds Age 1-18 (1 of 2 - Standard Series) 10/2/1998 DTaP/Tdap/Td series (1 - Tdap) 10/2/2004 HPV AGE 9Y-34Y (1 of 3 - Male 3 Dose Series) 10/2/2008 INFLUENZA AGE 9 TO ADULT 8/1/2016 Allergies as of 4/10/2017  Review Complete On: 4/10/2017 By: Zuleyka Sinclair PA-C No Known Allergies Current Immunizations  Never Reviewed No immunizations on file. Not reviewed this visit You Were Diagnosed With   
  
 Codes Comments Achilles tendon rupture, right, subsequent encounter    -  Primary ICD-10-CM: S86.011D ICD-9-CM: V58.89, 845.09 Pre-operative examination     ICD-10-CM: U00.794 ICD-9-CM: V72.84 Vitals BP Pulse Temp Height(growth percentile) Smoking Status 130/79 (65 %/ 50 %)* 74 97.8 °F (36.6 °C) (Oral) 6' 2\" (1.88 m) (94 %, Z= 1.58) Never Smoker *BP percentiles are based on NHBPEP's 4th Report Growth percentiles are based on CDC 2-20 Years data. Your Updated Medication List  
  
   
This list is accurate as of: 4/10/17  3:44 PM.  Always use your most recent med list.  
  
  
  
  
 aspirin 325 mg tablet Commonly known as:  ASPIRIN Take 1 Tab by mouth daily. **START TAKING AFTER SURGERY** HYDROcodone-acetaminophen 5-325 mg per tablet Commonly known as:  NORCO  
  
 oxyCODONE-acetaminophen 7.5-325 mg per tablet Commonly known as:  PERCOCET TAKE ONE TO TWO TABLETS BY MOUTH Q 4 - 6 HRS AS NEEDED FOR PAIN **AFTER SURGERY** DO NOT TAKE BEFORE SURGERY  Indications: Pain  
  
 polyethylene glycol 17 gram packet Commonly known as:  Silvino Bonier Take 1 Packet by mouth daily. promethazine 25 mg tablet Commonly known as:  PHENERGAN Take 1 Tab by mouth every six (6) hours as needed for Nausea. TYLENOL 325 mg tablet Generic drug:  acetaminophen Take  by mouth every four (4) hours as needed for Pain. Prescriptions Printed Refills  
 oxyCODONE-acetaminophen (PERCOCET) 7.5-325 mg per tablet 0 Sig: TAKE ONE TO TWO TABLETS BY MOUTH Q 4 - 6 HRS AS NEEDED FOR PAIN **AFTER SURGERY** DO NOT TAKE BEFORE SURGERY  Indications: Pain Class: Print  
 aspirin (ASPIRIN) 325 mg tablet 0 Sig: Take 1 Tab by mouth daily. **START TAKING AFTER SURGERY**  
 Class: Print Route: Oral  
 promethazine (PHENERGAN) 25 mg tablet 0 Sig: Take 1 Tab by mouth every six (6) hours as needed for Nausea. Class: Print Route: Oral  
 polyethylene glycol (MIRALAX) 17 gram packet 1 Sig: Take 1 Packet by mouth daily. Class: Print Route: Oral  
  
We Performed the Following AMB SUPPLY ORDER [[id number] Custom] Comments:  
 4/10/2017 
3:40 PM 
 
Roll about knee roller 
iWalk Knee crutch Follow-up Instructions Return in about 9 days (around 2017) for post surgical evaluation. Patient Instructions Dr. Valerie Whalen Pre-operative Instructions: 
 
 
Patient: Yung Jim :  1995 I understand I am to stop taking all Aspirin, Aspirin containing medications, Non-Steroidal Anti-Inflammatory medications (such as Advil, Aleve, Motrin, Ibuprofen) and or Blood thinner medication such as Coumadin, Plavix, Heparin or others 5 days prior to surgery. I understand I am to STOP taking these Medications 5 days prior to surgery: 
I am to get instructions from my prescribing physician. 1. __As listed above_______________________ 2. _____________________________________ 3. _____________________________________ 4. _____________________________________ I understand that if I am taking daily medications for high blood pressure, I can take them the morning of surgery with a small sip of water. I will consult my prescribing physician or call [facility] with specific questions. I also understand that: ? I am to report important observations or changes that may occur prior to surgery. If I have any changes in my physical condition, such as a rash, a fever, sore throat, abscess, ulcers, nausea, vomiting, or diarrhea. I am to call the office and I am to consult my primary care physician to assess and treat the problem. ? I am not to eat or drink anything after midnight the night before my surgery. ? I am not to drink alcoholic beverages 24 hours prior to surgery. ? I am not to do any illegal drugs prior to surgery. ? I am not to smoke at least 24 hours prior to surgery. ? I am able and will shower or bathe before surgery. I will use the Hibiclens solution on my surgical site only. The hibiclens directions are one packet a day starting two days before surgery. ? I will remove any nail polish, make-up or jewelry prior to arriving for my surgery. ? If I wear glasses, contact lenses or dentures they must be removed prior to going to the operating room. ? All body piercing and artifical eye-lashes must be removed prior to surgery ? I will not wear any aerosol sprays, perfumes or skin creams. ? I am to make arrangements for a family member or friend to accompany me to surgery and take me home after my surgery as I will not be allowed to leave the hospital alone. A cab or bus will not be acceptable.  Please make arrange for someone to stay with you for 24 hours after surgery. ? Patient has expressed understanding of the diagnosis, treatment and planned surgery Achilles Tendon Tear: Care Instructions Your Care Instructions You have ruptured or torn your Achilles tendon. The Achilles tendon (also called the heel cord) connects the calf muscles on the back of the lower leg to the bone at the base of the heel. Treatment for an Achilles tendon injury depends on whether the tendon has been partially torn or completely ruptured. A cast or splint can often treat a partial tear. If your tendon has ruptured, you may need surgery. You and your orthopedic doctor will choose a treatment plan, so it is important to go to any follow-up appointments. Follow-up care is a key part of your treatment and safety. Be sure to make and go to all appointments, and call your doctor if you are having problems. Its also a good idea to know your test results and keep a list of the medicines you take. How can you care for yourself at home? · Prop up the sore foot on a pillow anytime you sit or lie down during the next 3 days. Try to keep it above the level of your heart. This will help reduce swelling. · Take pain medicines exactly as directed. ¨ If the doctor gave you a prescription medicine for pain, take it as prescribed. ¨ If you are not taking a prescription pain medicine, ask your doctor if you can take an over-the-counter medicine. · Do not put weight on the affected foot until your doctor says you can. Use crutches or a walker. · Wear the splint or cast as directed until your doctor says you can remove it. When should you call for help? Call 911 anytime you think you may need emergency care. For example, call if: 
· You have sudden chest pain and shortness of breath, or you cough up blood. Call your doctor now or seek immediate medical care if: 
· You have increased or severe pain. · Your foot is cool or pale or changes color. · You have tingling, weakness, or numbness in your toes. · Your cast or splint feels too tight. · You cannot move your toes. · You have a fever, or there is drainage or a bad smell coming from the cast. 
· You have signs of a blood clot, such as: 
¨ Pain in your calf, back of the knee, thigh, or groin. ¨ Redness or swelling in your leg. · The skin under your cast or splint burns or stings. Watch closely for changes in your health, and be sure to contact your doctor if: 
· You do not get better as expected. Where can you learn more? Go to http://linh-nuha.info/. Enter F495 in the search box to learn more about \"Achilles Tendon Tear: Care Instructions. \" Current as of: May 23, 2016 Content Version: 11.2 © 9086-5002 [employer]. Care instructions adapted under license by [employer] (which disclaims liability or warranty for this information). If you have questions about a medical condition or this instruction, always ask your healthcare professional. [place] [address] any warranty or liability for your use of this information. Surgery: What to Expect at [facility] Your Recovery This care sheet gives you a general idea about how long it will take for you to recover from your surgery. But each person recovers at a different pace. How can you care for yourself at home? Activity · Allow your body to heal. Don't move quickly or lift anything heavy until you are feeling better. · Rest when you feel tired. · Your doctor may give you specific instructions on when you can do your normal activities again, such as driving and going back to work. · Be active. Walking is a good choice. Diet · You can eat your normal diet when you feel well. If your stomach is upset, try bland, low-fat foods like plain rice, broiled chicken, toast, and yogurt. · If your bowel movements are not regular right after surgery, try to avoid constipation and straining. Drink plenty of water. Your doctor may suggest fiber, a stool softener, or a mild laxative. Medicines · Your doctor will tell you if and when you can restart your medicines. He or she will also give you instructions about taking any new medicines. · If you take blood thinners, such as warfarin (Coumadin), clopidogrel (Plavix), or aspirin, be sure to talk to your doctor. He or she will tell you if and when to start taking those medicines again. Make sure that you understand exactly what your doctor wants you to do. · Be safe with medicines. Read and follow all instructions on the label. ¨ If the doctor gave you a prescription medicine for pain, take it as prescribed. ¨ If you are not taking a prescription pain medicine, ask your doctor if you can take an over-the-counter medicine. Incision care · You will have a dressing over the cut (incision). A dressing helps the incision heal and protects it. Your doctor will tell you how to take care of this. · If you have strips of tape on the cut the doctor made, leave the tape on for a week or until it falls off. · If you had stitches, your doctor will tell you when to come back to have them removed. · If you have skin adhesive on the cut, leave it on until it falls off. Skin adhesive is also called liquid stitches. · Change the bandage every day. · Wash the area daily with warm, soapy water, and pat it dry. Don't use hydrogen peroxide or alcohol. They can slow healing. · You may cover the area with a gauze bandage if it oozes fluid or rubs against clothing. · You may shower 24 to 48 hours after surgery. Pat the incision dry. Don't swim or take a bath for the first 2 weeks, or until your doctor tells you it is okay. Follow-up care is a key part of your treatment and safety.  Be sure to make and go to all appointments, and call your doctor if you are having problems. It's also a good idea to know your test results and keep a list of the medicines you take. When should you call for help? Call 911 anytime you think you may need emergency care. For example, call if: 
· You passed out (lost consciousness). · You have severe trouble breathing. · You have sudden chest pain and shortness of breath, or you cough up blood. Call your doctor now or seek immediate medical care if: 
· You have pain that does not get better after you take pain medicine. · You have loose stitches, or your incision comes open. · You are bleeding through your dressing. A small amount of blood is normal. 
· You have signs of infection, such as: 
¨ Increased pain, swelling, warmth, or redness. ¨ Red streaks leading from the incision. ¨ Pus draining from the incision. ¨ A fever. · You have symptoms of a blood clot in your arm or leg (called a deep vein thrombosis). These may include: 
¨ Pain in your calf, back of the knee, thigh, or groin. ¨ Redness and swelling in the arm, leg, or groin. Watch closely for any changes in your health, and be sure to contact your doctor if: 
· You do not have a bowel movement after taking a laxative. Where can you learn more? Go to http://linh-nuha.info/ Enter O174 in the search box to learn more about \"Surgery: What to Expect at Home. \" 
© 4553-7132 [employer]wise, [employer]. Care instructions adapted under license by [employer] (which disclaims liability or warranty for this information). This care instruction is for use with your licensed healthcare professional. If you have questions about a medical condition or this instruction, always ask your healthcare professional. [place] [address] any warranty or liability for your use of this information. Content Version: 23.8.443734; Current as of: November 20, 2015 Introducing [facility]! [facility] introduces FairShare patient portal. Now you can access parts of your medical record, email your doctor's office, and request medication refills online. 1. In your internet browser, go to [email or web address] 2. Click on the First Time User? Click Here link in the Sign In box. You will see the New Member Sign Up page. 3. Enter your FairShare Access Code exactly as it appears below. You will not need to use this code after youve completed the sign-up process. If you do not sign up before the expiration date, you must request a new code. · FairShare Access Code: [email or web address] Expires: 7/5/2017  2:01 PM 
 
4. Enter the last four digits of your Social Security Number (xxxx) and Date of Birth (mm/dd/yyyy) as indicated and click Submit. You will be taken to the next sign-up page. 5. Create a FairShare ID. This will be your FairShare login ID and cannot be changed, so think of one that is secure and easy to remember. 6. Create a FairShare password. You can change your password at any time. 7. Enter your Password Reset Question and Answer. This can be used at a later time if you forget your password. 8. Enter your e-mail address. You will receive e-mail notification when new information is available in [address] 9. Click Sign Up. You can now view and download portions of your medical record. 10. Click the Download Summary menu link to download a portable copy of your medical information. If you have questions, please visit the Frequently Asked Questions section of the FairShare website. Remember, FairShare is NOT to be used for urgent needs. For medical emergencies, dial 911. Now available from your iPhone and Android! Please provide this summary of care documentation to your next provider. Your primary care clinician is listed as Srinivas Cristina. If you have any questions after today's visit, please call [phone].

## 2017-04-17 LAB
AMPHET UR QL SCN: NEGATIVE
BARBITURATES UR QL SCN: NEGATIVE
BENZODIAZ UR QL: NEGATIVE
CANNABINOIDS UR QL SCN: NEGATIVE
COCAINE UR QL SCN: NEGATIVE
HDSCOM,HDSCOM: ABNORMAL
METHADONE UR QL: NEGATIVE
OPIATES UR QL: NEGATIVE NG/ML
OPIATES UR QL: POSITIVE
PCP UR QL: NEGATIVE

## 2017-04-19 ENCOUNTER — DOCUMENTATION ONLY (OUTPATIENT)
Dept: ORTHOPEDIC SURGERY | Age: 22
End: 2017-04-19

## 2017-04-19 ENCOUNTER — OFFICE VISIT (OUTPATIENT)
Dept: ORTHOPEDIC SURGERY | Age: 22
End: 2017-04-19

## 2017-04-19 VITALS
DIASTOLIC BLOOD PRESSURE: 78 MMHG | BODY MASS INDEX: 21.44 KG/M2 | WEIGHT: 167 LBS | TEMPERATURE: 98 F | SYSTOLIC BLOOD PRESSURE: 133 MMHG | HEART RATE: 70 BPM

## 2017-04-19 DIAGNOSIS — S86.011D ACHILLES TENDON RUPTURE, RIGHT, SUBSEQUENT ENCOUNTER: Primary | ICD-10-CM

## 2017-04-19 NOTE — PROGRESS NOTES
AMBULATORY PROGRESS NOTE      Patient: Tammy Muro             MRN: [id number]     SSN: [id number] Body mass index is 21.44 kg/(m^2). YOB: 1995     AGE: 25 y.o. EX: male    PCP: Tona Vargas DO    IMPRESSION/DIAGNOSIS AND TREATMENT PLAN     DIAGNOSES  1. Achilles tendon rupture, right, subsequent encounter        No orders of the defined types were placed in this encounter. Tammy Muro understands his diagnoses and the proposed plan. Plan:    1) Right short leg splint placed  2) Right splint  3) Continue with OTC Tylenol once the Norco 5 mg prescription is completed  4)  mg on po each day. RTO - 2 weeks    HPI AND EXAMINATION     Mike Martinez IS A 25 y.o. male who presents to my outpatient office six (6) days s/p right Achilles' tendon repair. The date of Injury was April 5, 2017. The patient presents to the office today for assessment. He is wearing his right short leg cast and is ambulating with crutches. The right short leg cast was removed to assess the incision. Patient states that he is currently happy with his recovery. He has no pain at rest. Denies any new injuries, fevers, or chills. PHYSICAL EXAM: 4/19/2017      Patient is a well developed, well nourished 25 y.o. male alert and oriented x 3 in no acute distress. Visit Vitals    /78 (BP 1 Location: Left arm, BP Patient Position: Standing)    Pulse 70    Temp 98 °F (36.7 °C) (Oral)    Wt 167 lb (75.8 kg)    BMI 21.44 kg/m2       Tammy Muro arrives to office via: with assistive device:   he is accompanied in the examination room by: mother   APPEARANCE: alert, well appearing, and in no distress. PSYCH:  Normal affect, mood, and conduct. alert, oriented x 3  no dementia  GEN:  Well developed, well nourished 25 y.o. male in no acute distress.      right ankle  SURGICAL DRESSINGS:   Moderate soilage present  TENDERNESS:moderate incisional tenderness as (to be expected       after surgery)  SKIN INCISION: Incision looks good, No erythema and No Drainage  healing well, no dehiscence (approximately 8 cm length)  NEUROVASCULAR:  is grossly intact. Positive distal pulses and capillary refill. DVT ASSESSMENT: No evidence of DVT seen on physical exam.    ASSESSMENT/PLAN     1. INSTRUCTIONS: rest the injured area as much as practical, elevate the injured limb    2. RESTRICTIONS: Work/Activity/School restrictions: No overhead lifting  No gym class  No stairs--please allow use of elevator if available  No repetitive motion  No driving  No sports  NWB status is in place for RLE. 3. MEDICATIONS: No orders of the defined types were placed in this encounter. Alfredo Riggs has been instructed not to drive, not to make life critical decisions, not to     work (employment) and not to operate any machinery while taking Narcotic  Medications/Analgesics (Tramadol, Ultram, Ultracet). 4. FOLLOW UP TO SEE [facility]: if any calf pain, shortness of breath, any calf   Swelling    PLEASE SEEK IMMEDIATE ASSESSMENT BY ER PHYSICIAN IF ANY OF THE FOLLOWING EXIST:   Excessive pain, swelling, redness or odor of or around the surgical area   Temperature over 100.5   Nausea and vomiting lasting longer than 4 hours or if unable to take medications   Any signs of decreased circulation or nerve impairment to extremity: change in color, persistent numbness, tingling, coldness or increase pain   If Any calf pain, calf tightness, shortness of breath, chest pain  Any difficulty breathing at rest or with ambulation, any chest tightness/soreness    Severe intractable pain, Persistent swelling or drainage, development of a   wound, incisional redness, finger/toe swelling or color changes, or CALF PAIN.     CHART REVIEW     Past Medical History:   Diagnosis Date    Orthodontics braces     Current Outpatient Prescriptions   Medication Sig    oxyCODONE-acetaminophen (PERCOCET) 7.5-325 mg per tablet TAKE ONE TO TWO TABLETS BY MOUTH Q 4 - 6 HRS AS NEEDED FOR PAIN **AFTER SURGERY** DO NOT TAKE BEFORE SURGERY  Indications: Pain    aspirin (ASPIRIN) 325 mg tablet Take 1 Tab by mouth daily. **START TAKING AFTER SURGERY**    polyethylene glycol (MIRALAX) 17 gram packet Take 1 Packet by mouth daily.  HYDROcodone-acetaminophen (NORCO) 5-325 mg per tablet     promethazine (PHENERGAN) 25 mg tablet Take 1 Tab by mouth every six (6) hours as needed for Nausea.  acetaminophen (TYLENOL) 325 mg tablet Take  by mouth every four (4) hours as needed for Pain. No current facility-administered medications for this visit. No Known Allergies  Past Surgical History:   Procedure Laterality Date    HAND/FINGER SURGERY UNLISTED      HX ORTHOPAEDIC      finger    HX TONSIL AND ADENOIDECTOMY  2004     Social History     Occupational History    Not on file. Social History Main Topics    Smoking status: Never Smoker    Smokeless tobacco: Not on file    Alcohol use Yes      Comment: ocassional     Drug use: Yes     Special: Marijuana      Comment: last use was 3/10/17    Sexual activity: Not on file     Family History   Problem Relation Age of Onset    Diabetes Maternal Grandmother        REVIEW OF SYSTEMS : 4/19/2017  ALL BELOW ARE Negative except : SEE HPI       Constitutional: Negative for fever, chills and weight loss. Neg Weigh Loss  Cardiovascular: Negative for chest pain, claudication and leg swelling. SOB, CARRASCO   Gastrointestinal: Negative for  pain, N/V/D/C, Blood in stool or urine,dysuria, hematuria,        Incontinence, pelvic pain  Musculoskeletal: see HPI. Neurological: Negative for dizziness and weakness. Negative for headaches,Visual Changes, Confusion, Seizures,   Psychiatric/Behavioral: Negative for depression, memory loss and substance abuse. Extremities:  Negative for  hair changes, rash or skin lesion changes.   Hematologic: Negative for Bleeding problems, bruising, pallor or swollen lymph nodes. Peripheral Vascular: No calf pain, vascular vein tenderness to calf pain              No calf throbbing, posterior knee throbbing pain    DIAGNOSTIC IMAGING     No notes on file    Written by Brunilda Galeazzi, as dictated by Cheko Bashir MD. IDr., Cheko Bashir MD, confirm that all documentation is accurate.

## 2017-04-19 NOTE — PATIENT INSTRUCTIONS
Please follow up in 2 weeks. You are advised to contact us if your condition worsens. Achilles Tendon Tear: Care Instructions  Your Care Instructions    You have ruptured or torn your Achilles tendon. The Achilles tendon (also called the heel cord) connects the calf muscles on the back of the lower leg to the bone at the base of the heel. Treatment for an Achilles tendon injury depends on whether the tendon has been partially torn or completely ruptured. A cast or splint can often treat a partial tear. If your tendon has ruptured, you may need surgery. You and your orthopedic doctor will choose a treatment plan, so it is important to go to any follow-up appointments. Follow-up care is a key part of your treatment and safety. Be sure to make and go to all appointments, and call your doctor if you are having problems. Its also a good idea to know your test results and keep a list of the medicines you take. How can you care for yourself at home? · Prop up the sore foot on a pillow anytime you sit or lie down during the next 3 days. Try to keep it above the level of your heart. This will help reduce swelling. · Take pain medicines exactly as directed. ¨ If the doctor gave you a prescription medicine for pain, take it as prescribed. ¨ If you are not taking a prescription pain medicine, ask your doctor if you can take an over-the-counter medicine. · Do not put weight on the affected foot until your doctor says you can. Use crutches or a walker. · Wear the splint or cast as directed until your doctor says you can remove it. When should you call for help? Call 911 anytime you think you may need emergency care. For example, call if:  · You have sudden chest pain and shortness of breath, or you cough up blood. Call your doctor now or seek immediate medical care if:  · You have increased or severe pain. · Your foot is cool or pale or changes color.   · You have tingling, weakness, or numbness in your toes.  · Your cast or splint feels too tight. · You cannot move your toes. · You have a fever, or there is drainage or a bad smell coming from the cast.  · You have signs of a blood clot, such as:  ¨ Pain in your calf, back of the knee, thigh, or groin. ¨ Redness or swelling in your leg. · The skin under your cast or splint burns or stings. Watch closely for changes in your health, and be sure to contact your doctor if:  · You do not get better as expected. Where can you learn more? Go to http://linh-nuha.info/. Enter F495 in the search box to learn more about \"Achilles Tendon Tear: Care Instructions. \"  Current as of: May 23, 2016  Content Version: 11.2  © 3199-5967 [employer]. Care instructions adapted under license by [employer] (which disclaims liability or warranty for this information). If you have questions about a medical condition or this instruction, always ask your healthcare professional. [place] [address] any warranty or liability for your use of this information.

## 2017-04-19 NOTE — MR AVS SNAPSHOT
Visit Information Date & Time Provider Department Dept. Phone Encounter #  
 4/19/2017  2:30 PM Aida Catherine, [address] [facility] [phone] Your Appointments 4/19/2017  2:30 PM  
POST OP with Aida Catherine MD  
[address] [facility], [address] [facility] - [facility] ([facility]) Appt Note: Right Achilles tendon repair; Right Achilles tendon repair [address] [facility], [address] [facility]  
[phone] [facility], [facility] [address] Upcoming Health Maintenance Date Due Hepatitis A Peds Age 1-18 (1 of 2 - Standard Series) 10/2/1998 DTaP/Tdap/Td series (1 - Tdap) 10/2/2004 HPV AGE 9Y-34Y (1 of 3 - Male 3 Dose Series) 10/2/2008 INFLUENZA AGE 9 TO ADULT 8/1/2016 Allergies as of 4/19/2017  Review Complete On: 4/10/2017 By: Tamara Obregon PA-C No Known Allergies Current Immunizations  Never Reviewed No immunizations on file. Not reviewed this visit You Were Diagnosed With   
  
 Codes Comments Achilles tendon rupture, right, subsequent encounter    -  Primary ICD-10-CM: S86.011D ICD-9-CM: V58.89, 845.09 Vitals BP Pulse Temp 133/78 (75 %/ 46 %)* (BP 1 Location: Left arm, BP Patient Position: Standing) 70 98 °F (36.7 °C) (Oral) Weight(growth percentile) BMI Smoking Status 167 lb (75.8 kg) (68 %, Z= 0.47) 21.44 kg/m2 (32 %, Z= -0.48) Never Smoker *BP percentiles are based on NHBPEP's 4th Report Growth percentiles are based on CDC 2-20 Years data. Vitals History BMI and BSA Data Body Mass Index Body Surface Area  
 21.44 kg/m 2 1.99 m 2 Your Updated Medication List  
  
   
This list is accurate as of: 4/19/17  2:11 PM.  Always use your most recent med list.  
  
  
  
  
 aspirin 325 mg tablet Commonly known as:  ASPIRIN Take 1 Tab by mouth daily.  **START TAKING AFTER SURGERY**  
  
 HYDROcodone-acetaminophen 5-325 mg per tablet Commonly known as:  NORCO  
  
 oxyCODONE-acetaminophen 7.5-325 mg per tablet Commonly known as:  PERCOCET TAKE ONE TO TWO TABLETS BY MOUTH Q 4 - 6 HRS AS NEEDED FOR PAIN **AFTER SURGERY** DO NOT TAKE BEFORE SURGERY  Indications: Pain  
  
 polyethylene glycol 17 gram packet Commonly known as:  Chaneta Seibert Take 1 Packet by mouth daily. promethazine 25 mg tablet Commonly known as:  PHENERGAN Take 1 Tab by mouth every six (6) hours as needed for Nausea. TYLENOL 325 mg tablet Generic drug:  acetaminophen Take  by mouth every four (4) hours as needed for Pain. Patient Instructions Please follow up in 2 weeks. You are advised to contact us if your condition worsens. Achilles Tendon Tear: Care Instructions Your Care Instructions You have ruptured or torn your Achilles tendon. The Achilles tendon (also called the heel cord) connects the calf muscles on the back of the lower leg to the bone at the base of the heel. Treatment for an Achilles tendon injury depends on whether the tendon has been partially torn or completely ruptured. A cast or splint can often treat a partial tear. If your tendon has ruptured, you may need surgery. You and your orthopedic doctor will choose a treatment plan, so it is important to go to any follow-up appointments. Follow-up care is a key part of your treatment and safety. Be sure to make and go to all appointments, and call your doctor if you are having problems. Its also a good idea to know your test results and keep a list of the medicines you take. How can you care for yourself at home? · Prop up the sore foot on a pillow anytime you sit or lie down during the next 3 days. Try to keep it above the level of your heart. This will help reduce swelling. · Take pain medicines exactly as directed. ¨ If the doctor gave you a prescription medicine for pain, take it as prescribed. ¨ If you are not taking a prescription pain medicine, ask your doctor if you can take an over-the-counter medicine. · Do not put weight on the affected foot until your doctor says you can. Use crutches or a walker. · Wear the splint or cast as directed until your doctor says you can remove it. When should you call for help? Call 911 anytime you think you may need emergency care. For example, call if: 
· You have sudden chest pain and shortness of breath, or you cough up blood. Call your doctor now or seek immediate medical care if: 
· You have increased or severe pain. · Your foot is cool or pale or changes color. · You have tingling, weakness, or numbness in your toes. · Your cast or splint feels too tight. · You cannot move your toes. · You have a fever, or there is drainage or a bad smell coming from the cast. 
· You have signs of a blood clot, such as: 
¨ Pain in your calf, back of the knee, thigh, or groin. ¨ Redness or swelling in your leg. · The skin under your cast or splint burns or stings. Watch closely for changes in your health, and be sure to contact your doctor if: 
· You do not get better as expected. Where can you learn more? Go to http://linh-nuha.info/. Enter F495 in the search box to learn more about \"Achilles Tendon Tear: Care Instructions. \" Current as of: May 23, 2016 Content Version: 11.2 © 4421-7792 [employer]. Care instructions adapted under license by [employer] (which disclaims liability or warranty for this information). If you have questions about a medical condition or this instruction, always ask your healthcare professional. [address] any warranty or liability for your use of this information. Introducing [facility]! Marion Jimenez introduces [employer] patient portal. Now you can access parts of your medical record, email your doctor's office, and request medication refills online. 1. In your internet browser, go to [email or web address] 2. Click on the First Time User? Click Here link in the Sign In box. You will see the New Member Sign Up page. 3. Enter your [employer] Access Code exactly as it appears below. You will not need to use this code after youve completed the sign-up process. If you do not sign up before the expiration date, you must request a new code. · [employer] Access Code: [email or web address] Expires: 7/5/2017  2:01 PM 
 
4. Enter the last four digits of your Social Security Number (xxxx) and Date of Birth (mm/dd/yyyy) as indicated and click Submit. You will be taken to the next sign-up page. 5. Create a [employer] ID. This will be your [employer] login ID and cannot be changed, so think of one that is secure and easy to remember. 6. Create a [employer] password. You can change your password at any time. 7. Enter your Password Reset Question and Answer. This can be used at a later time if you forget your password. 8. Enter your e-mail address. You will receive e-mail notification when new information is available in [address] 9. Click Sign Up. You can now view and download portions of your medical record. 10. Click the Download Summary menu link to download a portable copy of your medical information. If you have questions, please visit the Frequently Asked Questions section of the [employer] website. Remember, [employer] is NOT to be used for urgent needs. For medical emergencies, dial 911. Now available from your iPhone and Android! Please provide this summary of care documentation to your next provider. Your primary care clinician is listed as Elvis Dyson. If you have any questions after today's visit, please call [phone].

## 2017-04-19 NOTE — PROGRESS NOTES
PT CAME BY HV OFFICE TO DROP OFF PROVIDER EVAL FORM FRO DR PAYNE TO COMPLETE.  PLEASE CALL PT [phone] WHEN DONE

## 2017-04-25 ENCOUNTER — DOCUMENTATION ONLY (OUTPATIENT)
Dept: ORTHOPEDIC SURGERY | Age: 22
End: 2017-04-25

## 2017-04-25 NOTE — PROGRESS NOTES
[facility] Provider Evaluation form completed & patient called to  at [facility].

## 2017-05-02 ENCOUNTER — OFFICE VISIT (OUTPATIENT)
Dept: ORTHOPEDIC SURGERY | Age: 22
End: 2017-05-02

## 2017-05-02 VITALS
TEMPERATURE: 97.2 F | HEART RATE: 76 BPM | DIASTOLIC BLOOD PRESSURE: 84 MMHG | SYSTOLIC BLOOD PRESSURE: 133 MMHG | HEIGHT: 74 IN | RESPIRATION RATE: 15 BRPM

## 2017-05-02 DIAGNOSIS — Z98.890 S/P ACHILLES TENDON REPAIR: Primary | ICD-10-CM

## 2017-05-02 NOTE — PROGRESS NOTES
AMBULATORY PROGRESS NOTE      Patient: Winnie Mathews             MRN: [id number]     SSN: [id number] There is no height or weight on file to calculate BMI. YOB: 1995     AGE: 25 y.o. EX: male    PCP: Audi Stapleton DO    IMPRESSION/DIAGNOSIS AND TREATMENT PLAN     DIAGNOSES  1. S/P Achilles tendon repair         Winnie Mathews understands his diagnoses and the proposed plan. Plan:    1) Sutures removed within the office today  2) Right short leg cast provided and placed  3) Continue NWB status of RLE  4) Continue OTC Tylenol and  mg    RTO - 3 weeks; Transition to right tall CAM boot at next visit    JUHI Fitzgerald IS A 25 y.o. male who presents to my outpatient office 19 days s/p right Achilles' tendon repair. The date of Injury was 2017. At last visit, I placed a right short leg splint, ordered the continuation of  mg, and recommended OTC Tylenol once the Norco 5 mg prescription . The patient presents to the office today wearing his right leg splint and ambulating with crutches. Patient denies any new injuries, fevers, or chills. He currently rates his pain 0/10 and was happy to have his sutures removed within the office today. He does note that he was feeling intermittent sharp pain that he associated with his sutures. PHYSICAL EXAM: 2017      Patient is a well developed, well nourished 25 y.o. male alert and oriented x 3 in no acute distress. Visit Vitals    /84    Pulse 76    Temp 97.2 °F (36.2 °C)    Resp 15    Ht 6' 2\" (1.88 m)        Winnie Mathews arrives to office via: with assistive device; accompanied by mother  Patient is a well developed, well nourished 25 y.o. male alert and oriented x 3 in no acute distress. APPEARANCE: alert, well appearing, and in no distress. PSYCH: Normal affect, mood, and conduct.  alert, oriented x 3  no dementia  GEN: Well developed, well nourished 25 y.o. male in no acute distress.      right ankle  SURGICAL DRESSINGS: No soilage present  TENDERNESS: Mild incisional tenderness as (to be expected  after surgery)  SKIN INCISION: Incision looks good, No erythema and No Drainage  healing well, no dehiscence (approximately 8 cm length); Sutures removed in the office today  NEUROVASCULAR: is grossly intact. Positive distal pulses and capillary refill. DVT ASSESSMENT: No evidence of DVT seen on physical exam.     ASSESSMENT/PLAN      1. INSTRUCTIONS: rest the injured area as much as practical, elevate the injured limb     2. RESTRICTIONS: Work/Activity/School restrictions: No overhead lifting  No gym class  No stairs--please allow use of elevator if available  No repetitive motion  No driving  No sports  NWB status is in place for RLE.     3. MEDICATIONS: No orders of the defined types were placed in this encounter.     Sea Felton has been instructed not to drive, not to make life critical decisions, not to  work (employment) and not to operate any machinery while taking Narcotic  Medications/Analgesics (Tramadol, Ultram, Ultracet).      4. FOLLOW UP TO SEE [facility]: if any calf pain, shortness of breath, any calf   Swelling     PLEASE SEEK IMMEDIATE ASSESSMENT BY ER PHYSICIAN IF ANY OF THE FOLLOWING EXIST:   · Excessive pain, swelling, redness or odor of or around the surgical area   · Temperature over 100.5   · Nausea and vomiting lasting longer than 4 hours or if unable to take medications   · Any signs of decreased circulation or nerve impairment to extremity: change in color, persistent numbness, tingling, coldness or increase pain   · If Any calf pain, calf tightness, shortness of breath, chest pain  · Any difficulty breathing at rest or with ambulation, any chest tightness/soreness  Severe intractable pain, Persistent swelling or drainage, development of a  wound, incisional redness, finger/toe swelling or color changes, or CALF PAIN.     CHART REVIEW     Past Medical History:   Diagnosis Date    Orthodontics braces     Current Outpatient Prescriptions   Medication Sig    aspirin (ASPIRIN) 325 mg tablet Take 1 Tab by mouth daily. **START TAKING AFTER SURGERY**    HYDROcodone-acetaminophen (NORCO) 5-325 mg per tablet     oxyCODONE-acetaminophen (PERCOCET) 7.5-325 mg per tablet TAKE ONE TO TWO TABLETS BY MOUTH Q 4 - 6 HRS AS NEEDED FOR PAIN **AFTER SURGERY** DO NOT TAKE BEFORE SURGERY  Indications: Pain    promethazine (PHENERGAN) 25 mg tablet Take 1 Tab by mouth every six (6) hours as needed for Nausea.  polyethylene glycol (MIRALAX) 17 gram packet Take 1 Packet by mouth daily.  acetaminophen (TYLENOL) 325 mg tablet Take  by mouth every four (4) hours as needed for Pain. No current facility-administered medications for this visit. No Known Allergies  Past Surgical History:   Procedure Laterality Date    HAND/FINGER SURGERY UNLISTED      HX ORTHOPAEDIC      finger    HX TONSIL AND ADENOIDECTOMY  2004     Social History     Occupational History    Not on file. Social History Main Topics    Smoking status: Never Smoker    Smokeless tobacco: Not on file    Alcohol use Yes      Comment: ocassional     Drug use: Yes     Special: Marijuana      Comment: last use was 3/10/17    Sexual activity: Not on file     Family History   Problem Relation Age of Onset    Diabetes Maternal Grandmother        REVIEW OF SYSTEMS : 5/2/2017  ALL BELOW ARE Negative except : SEE HPI       Constitutional: Negative for fever, chills and weight loss. Neg Weigh Loss  Cardiovascular: Negative for chest pain, claudication and leg swelling. SOB, CARRASCO   Gastrointestinal: Negative for  pain, N/V/D/C, Blood in stool or urine,dysuria, hematuria,        Incontinence, pelvic pain  Musculoskeletal: see HPI. Neurological: Negative for dizziness and weakness.                  Negative for headaches,Visual Changes, Confusion, Seizures,   Psychiatric/Behavioral: Negative for depression, memory loss and substance abuse. Extremities:  Negative for  hair changes, rash or skin lesion changes. Hematologic: Negative for Bleeding problems, bruising, pallor or swollen lymph nodes. Peripheral Vascular: No calf pain, vascular vein tenderness to calf pain              No calf throbbing, posterior knee throbbing pain    DIAGNOSTIC IMAGING     No notes on file    Written by Saul Ko, as dictated by Mitchael Dakin, MD. I, DrSameer, Mitchael Dakin, MD, confirm that all documentation is accurate.

## 2017-05-02 NOTE — PATIENT INSTRUCTIONS
Please follow up in 3 weeks. You are advised to contact us if your condition worsens. Achilles Tendon Repair: What to Expect at Home  Your Recovery    Achilles tendon repair reconnects the ends of the broken tendon so that you can use your foot again in a normal way. You may have had one of two types of surgery. In open surgery, the doctor makes a cut (incision) at the back of your leg. In percutaneous (say \"per-laurence-LUPE-ferdinand-us\") surgery, the doctor uses several smaller cuts. Tools for fixing the tendon are inserted through the cuts. You will feel tired for several days. Your lower leg and ankle will be swollen. You may have numbness around the cut (incision) on the back of your leg. Your ankle and shin may be bruised. You can put ice on the area to reduce swelling. It should be better in a few days. Your tendon will slowly get stronger as you recover. You will need to wear a cast or walking boot for 6 to 12 weeks after surgery. At first, it may be set to keep your foot pointed downward as the tendon heals. You may be able to put weight on your affected leg after a few weeks. But it will be several months before you have complete use of your leg and ankle. You will need to build your strength with rehabilitation (rehab) exercises. How soon you can return to sports or other exercise depends on how well you follow your rehab program and how well your tendon heals. Your doctor or physical therapist will give you an idea of when you can return to your activities. You may be able to return to your regular sports in about 4 to 6 months. This care sheet gives you a general idea about how long it will take for you to recover. But each person recovers at a different pace. Follow the steps below to get better as quickly as possible. How can you care for yourself at home? Activity  · Rest when you feel tired. Getting enough sleep will help you recover. Sleep with your sore leg raised.  Your doctor will tell you how to keep your leg and foot in the correct position. Keep your leg raised (such as on a pillow) as much as possible for the first few days. · You will need to wear a cast or walking boot that keeps your foot and ankle from moving for 6 to 12 weeks after surgery. · You can use crutches to move around the house to do daily tasks. Do not put weight on your leg without these until your doctor says it is okay. · You may shower 24 to 48 hours after surgery, if your doctor okays it. When you shower, keep your bandage and incision dry by taping a sheet of plastic to cover them. It might be best to get a shower stool to sit on. If you have a brace, only take it off if your doctor says it is okay. · If your doctor does not want you to shower or remove your brace, you can take a sponge bath. · Do not take a bath, swim, use a hot tub, or soak your leg until your doctor says it is okay. · You can drive when you can move and control your foot and ankle, you are no longer using crutches, and you are no longer taking prescription pain medicine. This usually takes 4 to 6 weeks. · How soon you can return to your work depends on your job. If you sit at work, you may be able to go back in 1 to 2 weeks. But if you are on your feet at work, it may take 6 to 8 weeks. If you are very physically active in your job, it may take 3 to 6 months. Diet  · You can eat your normal diet. If your stomach is upset, try bland, low-fat foods like plain rice, broiled chicken, toast, and yogurt. Drink plenty of fluids. · You may notice that your bowel movements are not regular right after your surgery. This is common. Try to avoid constipation and straining with bowel movements. You may want to take a fiber supplement every day. If you have not had a bowel movement after a couple of days, ask your doctor about taking a mild laxative. Medicines  · Your doctor will tell you if and when you can restart your medicines.  He or she will also give you instructions about taking any new medicines. · If you take blood thinners, such as warfarin (Coumadin), clopidogrel (Plavix), or aspirin, be sure to talk to your doctor. He or she will tell you if and when to start taking those medicines again. Make sure that you understand exactly what your doctor wants you to do. · Be safe with medicines. Take pain medicines exactly as directed. ¨ If the doctor gave you a prescription medicine for pain, take it as prescribed. ¨ If you are not taking a prescription pain medicine, ask your doctor if you can take an over-the-counter medicine. · If your doctor prescribed antibiotics, take them as directed. Do not stop taking them just because you feel better. You need to take the full course of antibiotics. · If you think your pain medicine is making you sick to your stomach:  ¨ Take your medicine after meals (unless your doctor has told you not to). ¨ Ask your doctor for a different pain medicine. Incision care  · If you have a bandage over your incision, keep the bandage clean and dry. Follow your doctor's instructions. Your doctor will probably want you to leave the bandage on until you are seen in the office. If your doctor allows it, you may be able to remove the bandage 48 to 72 hours after your surgery. · If you have strips of tape on the incision, leave the tape on for a week or until it falls off. Keep the area clean and dry. · If you have a splint or cast, follow your doctor's instructions. Keep it dry. Do not put anything, including powder, between the splint or cast and your skin. Exercise  · Exercise in a rehab program is an important part of your treatment. Your first exercises will help you regain flexibility. They may also keep scar tissue from forming around the tendon. Ice and elevation  · To reduce swelling and pain, put ice or a cold pack on your leg for 10 to 20 minutes at a time. Do this every few hours.  Put a thin cloth between the ice and your skin.  · Prop up the sore leg on a pillow when you ice it or anytime you sit or lie down for 3 days after surgery. Try to keep it above the level of your heart. This will help reduce swelling. · If your doctor gave you support stockings, wear them as long as instructed. These help to prevent blood clots. Follow-up care is a key part of your treatment and safety. Be sure to make and go to all appointments, and call your doctor if you are having problems. It's also a good idea to know your test results and keep a list of the medicines you take. When should you call for help? Call 911 anytime you think you may need emergency care. For example, call if:  · You passed out (lost consciousness). · You have severe trouble breathing. · You have sudden chest pain and shortness of breath, or you cough up blood. Call your doctor now or seek immediate medical care if:  · You have pain that does not go away after you take pain medicine. · You have loose stitches, or your incision comes open. · Bright red blood has soaked through the bandage over your incision. · You have signs of infection, such as:  ¨ Increased pain, swelling, warmth, or redness. ¨ Red streaks leading from the incision. ¨ Pus draining from the incision. ¨ Swollen lymph nodes in your neck, armpits, or groin. ¨ A fever. · You have signs of a blood clot, such as:  ¨ Pain in your calf, back of the knee, thigh, or groin. ¨ Redness and swelling in your leg or groin. Watch closely for any changes in your health, and be sure to contact your doctor if:  · You are sick to your stomach or cannot keep fluids down. · You have swelling, tingling, pain, or numbness in your toes that does not go away when you raise your knee above the level of your heart. · You do not have a bowel movement after taking a laxative. Where can you learn more? Go to http://lihn-nuha.info/.   Enter E001 in the search box to learn more about \"Achilles Tendon Repair: What to Expect at Home. \"  Current as of: May 23, 2016  Content Version: 11.2  © 1123-6779 [employer]. Care instructions adapted under license by [employer] (which disclaims liability or warranty for this information). If you have questions about a medical condition or this instruction, always ask your healthcare professional. [place] [address] any warranty or liability for your use of this information.

## 2017-05-02 NOTE — MR AVS SNAPSHOT
Visit Information Date & Time Provider Department Dept. Phone Encounter #  
 5/2/2017  8:20 AM Marleen Jessica, [address] [facility] [phone] [id number] Upcoming Health Maintenance Date Due Hepatitis A Peds Age 1-18 (1 of 2 - Standard Series) 10/2/1998 DTaP/Tdap/Td series (1 - Tdap) 10/2/2004 HPV AGE 9Y-34Y (1 of 3 - Male 3 Dose Series) 10/2/2008 INFLUENZA AGE 9 TO ADULT 8/1/2017 Allergies as of 5/2/2017  Review Complete On: 5/2/2017 By: Lorenza Adams LPN No Known Allergies Current Immunizations  Never Reviewed No immunizations on file. Not reviewed this visit You Were Diagnosed With   
  
 Codes Comments S/P Achilles tendon repair    -  Primary ICD-10-CM: J14.767 ICD-9-CM: V45.89 Right-sided Vitals BP Pulse Temp Resp Height(growth percentile) Smoking Status 133/84 (75 %/ 65 %)* 76 97.2 °F (36.2 °C) 15 6' 2\" (1.88 m) (94 %, Z= 1.58) Never Smoker *BP percentiles are based on NHBPEP's 4th Report Growth percentiles are based on CDC 2-20 Years data. Vitals History Your Updated Medication List  
  
   
This list is accurate as of: 5/2/17  9:13 AM.  Always use your most recent med list.  
  
  
  
  
 aspirin 325 mg tablet Commonly known as:  ASPIRIN Take 1 Tab by mouth daily. **START TAKING AFTER SURGERY** HYDROcodone-acetaminophen 5-325 mg per tablet Commonly known as:  NORCO  
  
 oxyCODONE-acetaminophen 7.5-325 mg per tablet Commonly known as:  PERCOCET TAKE ONE TO TWO TABLETS BY MOUTH Q 4 - 6 HRS AS NEEDED FOR PAIN **AFTER SURGERY** DO NOT TAKE BEFORE SURGERY  Indications: Pain  
  
 polyethylene glycol 17 gram packet Commonly known as:  [place] Take 1 Packet by mouth daily. promethazine 25 mg tablet Commonly known as:  PHENERGAN Take 1 Tab by mouth every six (6) hours as needed for Nausea. TYLENOL 325 mg tablet Generic drug:  acetaminophen Take  by mouth every four (4) hours as needed for Pain. Patient Instructions Please follow up in 3 weeks. You are advised to contact us if your condition worsens. Achilles Tendon Repair: What to Expect at Home Your Recovery Achilles tendon repair reconnects the ends of the broken tendon so that you can use your foot again in a normal way. You may have had one of two types of surgery. In open surgery, the doctor makes a cut (incision) at the back of your leg. In percutaneous (say \"per-laurence-LUPE-ferdinand-\") surgery, the doctor uses several smaller cuts. Tools for fixing the tendon are inserted through the cuts. You will feel tired for several days. Your lower leg and ankle will be swollen. You may have numbness around the cut (incision) on the back of your leg. Your ankle and shin may be bruised. You can put ice on the area to reduce swelling. It should be better in a few days. Your tendon will slowly get stronger as you recover. You will need to wear a cast or walking boot for 6 to 12 weeks after surgery. At first, it may be set to keep your foot pointed downward as the tendon heals. You may be able to put weight on your affected leg after a few weeks. But it will be several months before you have complete use of your leg and ankle. You will need to build your strength with rehabilitation (rehab) exercises. How soon you can return to sports or other exercise depends on how well you follow your rehab program and how well your tendon heals. Your doctor or physical therapist will give you an idea of when you can return to your activities. You may be able to return to your regular sports in about 4 to 6 months. This care sheet gives you a general idea about how long it will take for you to recover. But each person recovers at a different pace. Follow the steps below to get better as quickly as possible. How can you care for yourself at home? Activity · Rest when you feel tired. Getting enough sleep will help you recover. Sleep with your sore leg raised. Your doctor will tell you how to keep your leg and foot in the correct position. Keep your leg raised (such as on a pillow) as much as possible for the first few days. · You will need to wear a cast or walking boot that keeps your foot and ankle from moving for 6 to 12 weeks after surgery. · You can use crutches to move around the house to do daily tasks. Do not put weight on your leg without these until your doctor says it is okay. · You may shower 24 to 48 hours after surgery, if your doctor okays it. When you shower, keep your bandage and incision dry by taping a sheet of plastic to cover them. It might be best to get a shower stool to sit on. If you have a brace, only take it off if your doctor says it is okay. · If your doctor does not want you to shower or remove your brace, you can take a sponge bath. · Do not take a bath, swim, use a hot tub, or soak your leg until your doctor says it is okay. · You can drive when you can move and control your foot and ankle, you are no longer using crutches, and you are no longer taking prescription pain medicine. This usually takes 4 to 6 weeks. · How soon you can return to your work depends on your job. If you sit at work, you may be able to go back in 1 to 2 weeks. But if you are on your feet at work, it may take 6 to 8 weeks. If you are very physically active in your job, it may take 3 to 6 months. Diet · You can eat your normal diet. If your stomach is upset, try bland, low-fat foods like plain rice, broiled chicken, toast, and yogurt. Drink plenty of fluids. · You may notice that your bowel movements are not regular right after your surgery. This is common. Try to avoid constipation and straining with bowel movements. You may want to take a fiber supplement every day.  If you have not had a bowel movement after a couple of days, ask your doctor about taking a mild laxative. Medicines · Your doctor will tell you if and when you can restart your medicines. He or she will also give you instructions about taking any new medicines. · If you take blood thinners, such as warfarin (Coumadin), clopidogrel (Plavix), or aspirin, be sure to talk to your doctor. He or she will tell you if and when to start taking those medicines again. Make sure that you understand exactly what your doctor wants you to do. · Be safe with medicines. Take pain medicines exactly as directed. ¨ If the doctor gave you a prescription medicine for pain, take it as prescribed. ¨ If you are not taking a prescription pain medicine, ask your doctor if you can take an over-the-counter medicine. · If your doctor prescribed antibiotics, take them as directed. Do not stop taking them just because you feel better. You need to take the full course of antibiotics. · If you think your pain medicine is making you sick to your stomach: 
¨ Take your medicine after meals (unless your doctor has told you not to). ¨ Ask your doctor for a different pain medicine. Incision care · If you have a bandage over your incision, keep the bandage clean and dry. Follow your doctor's instructions. Your doctor will probably want you to leave the bandage on until you are seen in the office. If your doctor allows it, you may be able to remove the bandage 48 to 72 hours after your surgery. · If you have strips of tape on the incision, leave the tape on for a week or until it falls off. Keep the area clean and dry. · If you have a splint or cast, follow your doctor's instructions. Keep it dry. Do not put anything, including powder, between the splint or cast and your skin. Exercise · Exercise in a rehab program is an important part of your treatment. Your first exercises will help you regain flexibility. They may also keep scar tissue from forming around the tendon. Ice and elevation · To reduce swelling and pain, put ice or a cold pack on your leg for 10 to 20 minutes at a time. Do this every few hours. Put a thin cloth between the ice and your skin. · Prop up the sore leg on a pillow when you ice it or anytime you sit or lie down for 3 days after surgery. Try to keep it above the level of your heart. This will help reduce swelling. · If your doctor gave you support stockings, wear them as long as instructed. These help to prevent blood clots. Follow-up care is a key part of your treatment and safety. Be sure to make and go to all appointments, and call your doctor if you are having problems. It's also a good idea to know your test results and keep a list of the medicines you take. When should you call for help? Call 911 anytime you think you may need emergency care. For example, call if: 
· You passed out (lost consciousness). · You have severe trouble breathing. · You have sudden chest pain and shortness of breath, or you cough up blood. Call your doctor now or seek immediate medical care if: 
· You have pain that does not go away after you take pain medicine. · You have loose stitches, or your incision comes open. · Bright red blood has soaked through the bandage over your incision. · You have signs of infection, such as: 
¨ Increased pain, swelling, warmth, or redness. ¨ Red streaks leading from the incision. ¨ Pus draining from the incision. ¨ Swollen lymph nodes in your neck, armpits, or groin. ¨ A fever. · You have signs of a blood clot, such as: 
¨ Pain in your calf, back of the knee, thigh, or groin. ¨ Redness and swelling in your leg or groin. Watch closely for any changes in your health, and be sure to contact your doctor if: 
· You are sick to your stomach or cannot keep fluids down. · You have swelling, tingling, pain, or numbness in your toes that does not go away when you raise your knee above the level of your heart. · You do not have a bowel movement after taking a laxative. Where can you learn more? Go to http://linh-nuha.info/. Enter U734 in the search box to learn more about \"Achilles Tendon Repair: What to Expect at Home. \" Current as of: May 23, 2016 Content Version: 11.2 © 1812-7775 [employer] Care instructions adapted under license by [employer] (which disclaims liability or warranty for this information). If you have questions about a medical condition or this instruction, always ask your healthcare professional. [address] any warranty or liability for your use of this information. Introducing hospitals & HEALTH SERVICES! Laura Garcia introduces [facility] patient portal. Now you can access parts of your medical record, email your doctor's office, and request medication refills online. 1. In your internet browser, go to [email or web address] 2. Click on the First Time User? Click Here link in the Sign In box. You will see the New Member Sign Up page. 3. Enter your [facility] Access Code exactly as it appears below. You will not need to use this code after youve completed the sign-up process. If you do not sign up before the expiration date, you must request a new code. · [facility] Access Code: [email or web address] Expires: 7/5/2017  2:01 PM 
 
4. Enter the last four digits of your Social Security Number (xxxx) and Date of Birth (mm/dd/yyyy) as indicated and click Submit. You will be taken to the next sign-up page. 5. Create a [employer] ID. This will be your [facility] login ID and cannot be changed, so think of one that is secure and easy to remember. 6. Create a [facility] password. You can change your password at any time. 7. Enter your Password Reset Question and Answer. This can be used at a later time if you forget your password. 8. Enter your e-mail address.  You will receive e-mail notification when new information is available in [facility]. 9. Click Sign Up. You can now view and download portions of your medical record. 10. Click the Download Summary menu link to download a portable copy of your medical information. If you have questions, please visit the Frequently Asked Questions section of the [employer] website. Remember, [facility] is NOT to be used for urgent needs. For medical emergencies, dial 911. Now available from your iPhone and Android! Please provide this summary of care documentation to your next provider. Your primary care clinician is listed as Bri Deercroft. If you have any questions after today's visit, please call [phone].

## 2017-05-18 DIAGNOSIS — S86.011A ACHILLES RUPTURE, RIGHT, INITIAL ENCOUNTER: Primary | ICD-10-CM

## 2017-05-23 ENCOUNTER — OFFICE VISIT (OUTPATIENT)
Dept: ORTHOPEDIC SURGERY | Age: 22
End: 2017-05-23

## 2017-05-23 VITALS
HEART RATE: 85 BPM | SYSTOLIC BLOOD PRESSURE: 132 MMHG | HEIGHT: 74 IN | DIASTOLIC BLOOD PRESSURE: 75 MMHG | TEMPERATURE: 97.7 F

## 2017-05-23 DIAGNOSIS — Z98.890 S/P ACHILLES TENDON REPAIR: Primary | ICD-10-CM

## 2017-05-23 NOTE — PATIENT INSTRUCTIONS
Please follow up in 3 weeks. You are advised to contact us if your condition worsens. Achilles Tendon Repair: What to Expect at Home  Your Recovery    Achilles tendon repair reconnects the ends of the broken tendon so that you can use your foot again in a normal way. You may have had one of two types of surgery. In open surgery, the doctor makes a cut (incision) at the back of your leg. In percutaneous (say \"per-laurence-LUPE-ferdinand-us\") surgery, the doctor uses several smaller cuts. Tools for fixing the tendon are inserted through the cuts. You will feel tired for several days. Your lower leg and ankle will be swollen. You may have numbness around the cut (incision) on the back of your leg. Your ankle and shin may be bruised. You can put ice on the area to reduce swelling. It should be better in a few days. Your tendon will slowly get stronger as you recover. You will need to wear a cast or walking boot for 6 to 12 weeks after surgery. At first, it may be set to keep your foot pointed downward as the tendon heals. You may be able to put weight on your affected leg after a few weeks. But it will be several months before you have complete use of your leg and ankle. You will need to build your strength with rehabilitation (rehab) exercises. How soon you can return to sports or other exercise depends on how well you follow your rehab program and how well your tendon heals. Your doctor or physical therapist will give you an idea of when you can return to your activities. You may be able to return to your regular sports in about 4 to 6 months. This care sheet gives you a general idea about how long it will take for you to recover. But each person recovers at a different pace. Follow the steps below to get better as quickly as possible. How can you care for yourself at home? Activity  · Rest when you feel tired. Getting enough sleep will help you recover. Sleep with your sore leg raised.  Your doctor will tell you how to keep your leg and foot in the correct position. Keep your leg raised (such as on a pillow) as much as possible for the first few days. · You will need to wear a cast or walking boot that keeps your foot and ankle from moving for 6 to 12 weeks after surgery. · You can use crutches to move around the house to do daily tasks. Do not put weight on your leg without these until your doctor says it is okay. · You may shower 24 to 48 hours after surgery, if your doctor okays it. When you shower, keep your bandage and incision dry by taping a sheet of plastic to cover them. It might be best to get a shower stool to sit on. If you have a brace, only take it off if your doctor says it is okay. · If your doctor does not want you to shower or remove your brace, you can take a sponge bath. · Do not take a bath, swim, use a hot tub, or soak your leg until your doctor says it is okay. · You can drive when you can move and control your foot and ankle, you are no longer using crutches, and you are no longer taking prescription pain medicine. This usually takes 4 to 6 weeks. · How soon you can return to your work depends on your job. If you sit at work, you may be able to go back in 1 to 2 weeks. But if you are on your feet at work, it may take 6 to 8 weeks. If you are very physically active in your job, it may take 3 to 6 months. Diet  · You can eat your normal diet. If your stomach is upset, try bland, low-fat foods like plain rice, broiled chicken, toast, and yogurt. Drink plenty of fluids. · You may notice that your bowel movements are not regular right after your surgery. This is common. Try to avoid constipation and straining with bowel movements. You may want to take a fiber supplement every day. If you have not had a bowel movement after a couple of days, ask your doctor about taking a mild laxative. Medicines  · Your doctor will tell you if and when you can restart your medicines.  He or she will also give you instructions about taking any new medicines. · If you take blood thinners, such as warfarin (Coumadin), clopidogrel (Plavix), or aspirin, be sure to talk to your doctor. He or she will tell you if and when to start taking those medicines again. Make sure that you understand exactly what your doctor wants you to do. · Be safe with medicines. Take pain medicines exactly as directed. ¨ If the doctor gave you a prescription medicine for pain, take it as prescribed. ¨ If you are not taking a prescription pain medicine, ask your doctor if you can take an over-the-counter medicine. · If your doctor prescribed antibiotics, take them as directed. Do not stop taking them just because you feel better. You need to take the full course of antibiotics. · If you think your pain medicine is making you sick to your stomach:  ¨ Take your medicine after meals (unless your doctor has told you not to). ¨ Ask your doctor for a different pain medicine. Incision care  · If you have a bandage over your incision, keep the bandage clean and dry. Follow your doctor's instructions. Your doctor will probably want you to leave the bandage on until you are seen in the office. If your doctor allows it, you may be able to remove the bandage 48 to 72 hours after your surgery. · If you have strips of tape on the incision, leave the tape on for a week or until it falls off. Keep the area clean and dry. · If you have a splint or cast, follow your doctor's instructions. Keep it dry. Do not put anything, including powder, between the splint or cast and your skin. Exercise  · Exercise in a rehab program is an important part of your treatment. Your first exercises will help you regain flexibility. They may also keep scar tissue from forming around the tendon. Ice and elevation  · To reduce swelling and pain, put ice or a cold pack on your leg for 10 to 20 minutes at a time. Do this every few hours.  Put a thin cloth between the ice and your skin.  · Prop up the sore leg on a pillow when you ice it or anytime you sit or lie down for 3 days after surgery. Try to keep it above the level of your heart. This will help reduce swelling. · If your doctor gave you support stockings, wear them as long as instructed. These help to prevent blood clots. Follow-up care is a key part of your treatment and safety. Be sure to make and go to all appointments, and call your doctor if you are having problems. It's also a good idea to know your test results and keep a list of the medicines you take. When should you call for help? Call 911 anytime you think you may need emergency care. For example, call if:  · You passed out (lost consciousness). · You have severe trouble breathing. · You have sudden chest pain and shortness of breath, or you cough up blood. Call your doctor now or seek immediate medical care if:  · You have pain that does not go away after you take pain medicine. · You have loose stitches, or your incision comes open. · Bright red blood has soaked through the bandage over your incision. · You have signs of infection, such as:  ¨ Increased pain, swelling, warmth, or redness. ¨ Red streaks leading from the incision. ¨ Pus draining from the incision. ¨ Swollen lymph nodes in your neck, armpits, or groin. ¨ A fever. · You have signs of a blood clot, such as:  ¨ Pain in your calf, back of the knee, thigh, or groin. ¨ Redness and swelling in your leg or groin. Watch closely for any changes in your health, and be sure to contact your doctor if:  · You are sick to your stomach or cannot keep fluids down. · You have swelling, tingling, pain, or numbness in your toes that does not go away when you raise your knee above the level of your heart. · You do not have a bowel movement after taking a laxative. Where can you learn more? Go to http://linh-nuha.info/.   Enter M233 in the search box to learn more about \"Achilles Tendon Repair: What to Expect at Home. \"  Current as of: May 23, 2016  Content Version: 11.2  © 5003-0221 [employer]. Care instructions adapted under license by [employer] (which disclaims liability or warranty for this information). If you have questions about a medical condition or this instruction, always ask your healthcare professional. [place] [address] any warranty or liability for your use of this information.

## 2017-05-23 NOTE — PROGRESS NOTES
AMBULATORY PROGRESS NOTE      Patient: Bernadine Salazar             MRN: [id number]     SSN: [id number] There is no height or weight on file to calculate BMI. YOB: 1995     AGE: 25 y.o. EX: male    PCP: Isaac Stone DO    IMPRESSION/DIAGNOSIS AND TREATMENT PLAN     DIAGNOSES  1. S/P Achilles tendon repair        Orders Placed This Encounter    AMB SUPPLY ORDER    REFERRAL TO PHYSICAL THERAPY      Bernadine Salazar understands his diagnoses and the proposed plan. Plan:    1) Right tall CAM boot with wedges  2) HEP: Gentle ROM (x3 times a day)  3) Dressings applied to protect incision  4) Referral to physical therapy; Gentle ROM active and passive of ankle, do not attempt to go to ninety degrees until 6/8/2017. RTO - 3 weeks    HPI AND EXAMINATION     Tamara Martinez IS A 25 y.o. male who presents to my outpatient office 40 days s/p right Achilles' tendon repair. The date of Injury was April 5, 2017. At last visit, I removed the patient's sutures within the office, replaced the right short leg cast, instructed continued NWB of the RLE, and instructed continued use of OTC Tylenol and  mg. The patient presents to the office today wearing his right short leg cast and ambulating with crutches. The cast was removed within the office. Patient states that he is doing well, and he is happy to be transitioned to a right tall CAM boot. He understood the proposed gentle ROM HEP and will attend physical therapy. Bernadine aSlazar arrives to office via: with assistive device; accompanied by mother  Patient is a well developed, well nourished 25 y.o. male alert and oriented x 3 in no acute distress. RIGHT ANKLE:  APPEARANCE: alert, well appearing, and in no distress. PSYCH: Normal affect, mood, and conduct. alert, oriented x 3  no dementia  GEN: Well developed, well nourished 25 y.o. male in no acute distress.    SURGICAL DRESSINGS: No soilage present  TENDERNESS: Mild incisional tenderness as (to be expected after surgery)  SKIN INCISION: Incision looks good (approximately 8 cm length), No erythema and No Drainage, healing well, no dehiscence  NEUROVASCULAR: is grossly intact. Positive distal pulses and capillary refill. DVT ASSESSMENT: No evidence of DVT seen on physical exam.  ROM: ankle limited due to recent surgery    CHART REVIEW     Past Medical History:   Diagnosis Date    Orthodontics braces     Current Outpatient Prescriptions   Medication Sig    HYDROcodone-acetaminophen (NORCO) 5-325 mg per tablet     oxyCODONE-acetaminophen (PERCOCET) 7.5-325 mg per tablet TAKE ONE TO TWO TABLETS BY MOUTH Q 4 - 6 HRS AS NEEDED FOR PAIN **AFTER SURGERY** DO NOT TAKE BEFORE SURGERY  Indications: Pain    aspirin (ASPIRIN) 325 mg tablet Take 1 Tab by mouth daily. **START TAKING AFTER SURGERY**    promethazine (PHENERGAN) 25 mg tablet Take 1 Tab by mouth every six (6) hours as needed for Nausea.  polyethylene glycol (MIRALAX) 17 gram packet Take 1 Packet by mouth daily.  acetaminophen (TYLENOL) 325 mg tablet Take  by mouth every four (4) hours as needed for Pain. No current facility-administered medications for this visit. No Known Allergies  Past Surgical History:   Procedure Laterality Date    HAND/FINGER SURGERY UNLISTED      HX ORTHOPAEDIC      finger    HX TONSIL AND ADENOIDECTOMY  2004     Social History     Occupational History    Not on file. Social History Main Topics    Smoking status: Never Smoker    Smokeless tobacco: Not on file    Alcohol use Yes      Comment: ocassional     Drug use: Yes     Special: Marijuana      Comment: last use was 3/10/17    Sexual activity: Not on file     Family History   Problem Relation Age of Onset    Diabetes Maternal Grandmother        REVIEW OF SYSTEMS : 5/23/2017  ALL BELOW ARE Negative except : SEE HPI       Constitutional: Negative for fever, chills and weight loss.  Neg Weigh Loss  Cardiovascular: Negative for chest pain, claudication and leg swelling. SOB, CARRASCO   Gastrointestinal: Negative for  pain, N/V/D/C, Blood in stool or urine,dysuria, hematuria,        Incontinence, pelvic pain  Musculoskeletal: see HPI. Neurological: Negative for dizziness and weakness. Negative for headaches,Visual Changes, Confusion, Seizures,   Psychiatric/Behavioral: Negative for depression, memory loss and substance abuse. Extremities:  Negative for  hair changes, rash or skin lesion changes. Hematologic: Negative for Bleeding problems, bruising, pallor or swollen lymph nodes. Peripheral Vascular: No calf pain, vascular vein tenderness to calf pain              No calf throbbing, posterior knee throbbing pain    DIAGNOSTIC IMAGING     No notes on file    Written by Javier Vogel, as dictated by Bibi Samayoa MD. I, , Bibi Samayoa MD, confirm that all documentation is accurate.

## 2017-05-23 NOTE — MR AVS SNAPSHOT
Visit Information Date & Time Provider Department Dept. Phone Encounter #  
 5/23/2017  8:45 AM Steve Aparicio, 27 [facility] 21  Follow-up Instructions Return in about 3 weeks (around 6/13/2017). Follow-up and Disposition History Upcoming Health Maintenance Date Due Hepatitis A Peds Age 1-18 (1 of 2 - Standard Series) 10/2/1998 DTaP/Tdap/Td series (1 - Tdap) 10/2/2004 HPV AGE 9Y-34Y (1 of 3 - Male 3 Dose Series) 10/2/2008 INFLUENZA AGE 9 TO ADULT 8/1/2017 Allergies as of 5/23/2017  Review Complete On: 5/23/2017 By: Steve Aparicio MD  
 No Known Allergies Current Immunizations  Never Reviewed No immunizations on file. Not reviewed this visit You Were Diagnosed With   
  
 Codes Comments S/P Achilles tendon repair    -  Primary ICD-10-CM: V77.297 ICD-9-CM: V45.89 Right Vitals BP Pulse Temp Height(growth percentile) Smoking Status 132/75 (71 %/ 34 %)* 85 97.7 °F (36.5 °C) (Oral) 6' 2\" (1.88 m) (94 %, Z= 1.58) Never Smoker *BP percentiles are based on NHBPEP's 4th Report Growth percentiles are based on CDC 2-20 Years data. Vitals History Your Updated Medication List  
  
   
This list is accurate as of: 5/23/17  9:26 AM.  Always use your most recent med list.  
  
  
  
  
 aspirin 325 mg tablet Commonly known as:  ASPIRIN Take 1 Tab by mouth daily. **START TAKING AFTER SURGERY** HYDROcodone-acetaminophen 5-325 mg per tablet Commonly known as:  NORCO  
  
 oxyCODONE-acetaminophen 7.5-325 mg per tablet Commonly known as:  PERCOCET TAKE ONE TO TWO TABLETS BY MOUTH Q 4 - 6 HRS AS NEEDED FOR PAIN **AFTER SURGERY** DO NOT TAKE BEFORE SURGERY  Indications: Pain  
  
 polyethylene glycol 17 gram packet Commonly known as:  Ulysses Kassandra Take 1 Packet by mouth daily. promethazine 25 mg tablet Commonly known as:  PHENERGAN  
 Take 1 Tab by mouth every six (6) hours as needed for Nausea. TYLENOL 325 mg tablet Generic drug:  acetaminophen Take  by mouth every four (4) hours as needed for Pain. We Performed the Following AMB SUPPLY ORDER [[id number] Custom] Comments:  
 Order date: 5/23/2017 Right tall CAM boot with wedge REFERRAL TO PHYSICAL THERAPY [YUH76 Custom] Comments:  
 Evaluation and treatment of right Achilles' tendon repair two to three times a week for six weeks. Please use gentle ROM (active and passive). Do not attempt to go to ninety degrees until 6/8/2017. Follow-up Instructions Return in about 3 weeks (around 6/13/2017). Referral Information Referral ID Referred By Referred To  
  
 [id number] FRANKIE PAYNE [address] [facility] [place]   
   [address] [facility] [address] Phone: [phone] Fax: [phone] Visits Status Start Date End Date 1 New Request 5/23/17 5/23/18 If your referral has a status of pending review or denied, additional information will be sent to support the outcome of this decision. Patient Instructions Please follow up in 3 weeks. You are advised to contact us if your condition worsens. Achilles Tendon Repair: What to Expect at Home Your Recovery Achilles tendon repair reconnects the ends of the broken tendon so that you can use your foot again in a normal way. You may have had one of two types of surgery. In open surgery, the doctor makes a cut (incision) at the back of your leg. In percutaneous (say \"per-laurence-LUPE-ferdinand-\") surgery, the doctor uses several smaller cuts. Tools for fixing the tendon are inserted through the cuts. You will feel tired for several days. Your lower leg and ankle will be swollen. You may have numbness around the cut (incision) on the back of your leg. Your ankle and shin may be bruised.  You can put ice on the area to reduce swelling. It should be better in a few days. Your tendon will slowly get stronger as you recover. You will need to wear a cast or walking boot for 6 to 12 weeks after surgery. At first, it may be set to keep your foot pointed downward as the tendon heals. You may be able to put weight on your affected leg after a few weeks. But it will be several months before you have complete use of your leg and ankle. You will need to build your strength with rehabilitation (rehab) exercises. How soon you can return to sports or other exercise depends on how well you follow your rehab program and how well your tendon heals. Your doctor or physical therapist will give you an idea of when you can return to your activities. You may be able to return to your regular sports in about 4 to 6 months. This care sheet gives you a general idea about how long it will take for you to recover. But each person recovers at a different pace. Follow the steps below to get better as quickly as possible. How can you care for yourself at home? Activity · Rest when you feel tired. Getting enough sleep will help you recover. Sleep with your sore leg raised. Your doctor will tell you how to keep your leg and foot in the correct position. Keep your leg raised (such as on a pillow) as much as possible for the first few days. · You will need to wear a cast or walking boot that keeps your foot and ankle from moving for 6 to 12 weeks after surgery. · You can use crutches to move around the house to do daily tasks. Do not put weight on your leg without these until your doctor says it is okay. · You may shower 24 to 48 hours after surgery, if your doctor okays it. When you shower, keep your bandage and incision dry by taping a sheet of plastic to cover them. It might be best to get a shower stool to sit on. If you have a brace, only take it off if your doctor says it is okay. · If your doctor does not want you to shower or remove your brace, you can take a sponge bath. · Do not take a bath, swim, use a hot tub, or soak your leg until your doctor says it is okay. · You can drive when you can move and control your foot and ankle, you are no longer using crutches, and you are no longer taking prescription pain medicine. This usually takes 4 to 6 weeks. · How soon you can return to your work depends on your job. If you sit at work, you may be able to go back in 1 to 2 weeks. But if you are on your feet at work, it may take 6 to 8 weeks. If you are very physically active in your job, it may take 3 to 6 months. Diet · You can eat your normal diet. If your stomach is upset, try bland, low-fat foods like plain rice, broiled chicken, toast, and yogurt. Drink plenty of fluids. · You may notice that your bowel movements are not regular right after your surgery. This is common. Try to avoid constipation and straining with bowel movements. You may want to take a fiber supplement every day. If you have not had a bowel movement after a couple of days, ask your doctor about taking a mild laxative. Medicines · Your doctor will tell you if and when you can restart your medicines. He or she will also give you instructions about taking any new medicines. · If you take blood thinners, such as warfarin (Coumadin), clopidogrel (Plavix), or aspirin, be sure to talk to your doctor. He or she will tell you if and when to start taking those medicines again. Make sure that you understand exactly what your doctor wants you to do. · Be safe with medicines. Take pain medicines exactly as directed. ¨ If the doctor gave you a prescription medicine for pain, take it as prescribed. ¨ If you are not taking a prescription pain medicine, ask your doctor if you can take an over-the-counter medicine. · If your doctor prescribed antibiotics, take them as directed.  Do not stop taking them just because you feel better. You need to take the full course of antibiotics. · If you think your pain medicine is making you sick to your stomach: 
¨ Take your medicine after meals (unless your doctor has told you not to). ¨ Ask your doctor for a different pain medicine. Incision care · If you have a bandage over your incision, keep the bandage clean and dry. Follow your doctor's instructions. Your doctor will probably want you to leave the bandage on until you are seen in the office. If your doctor allows it, you may be able to remove the bandage 48 to 72 hours after your surgery. · If you have strips of tape on the incision, leave the tape on for a week or until it falls off. Keep the area clean and dry. · If you have a splint or cast, follow your doctor's instructions. Keep it dry. Do not put anything, including powder, between the splint or cast and your skin. Exercise · Exercise in a rehab program is an important part of your treatment. Your first exercises will help you regain flexibility. They may also keep scar tissue from forming around the tendon. Ice and elevation · To reduce swelling and pain, put ice or a cold pack on your leg for 10 to 20 minutes at a time. Do this every few hours. Put a thin cloth between the ice and your skin. · Prop up the sore leg on a pillow when you ice it or anytime you sit or lie down for 3 days after surgery. Try to keep it above the level of your heart. This will help reduce swelling. · If your doctor gave you support stockings, wear them as long as instructed. These help to prevent blood clots. Follow-up care is a key part of your treatment and safety. Be sure to make and go to all appointments, and call your doctor if you are having problems. It's also a good idea to know your test results and keep a list of the medicines you take. When should you call for help? Call 911 anytime you think you may need emergency care. For example, call if: · You passed out (lost consciousness). · You have severe trouble breathing. · You have sudden chest pain and shortness of breath, or you cough up blood. Call your doctor now or seek immediate medical care if: 
· You have pain that does not go away after you take pain medicine. · You have loose stitches, or your incision comes open. · Bright red blood has soaked through the bandage over your incision. · You have signs of infection, such as: 
¨ Increased pain, swelling, warmth, or redness. ¨ Red streaks leading from the incision. ¨ Pus draining from the incision. ¨ Swollen lymph nodes in your neck, armpits, or groin. ¨ A fever. · You have signs of a blood clot, such as: 
¨ Pain in your calf, back of the knee, thigh, or groin. ¨ Redness and swelling in your leg or groin. Watch closely for any changes in your health, and be sure to contact your doctor if: 
· You are sick to your stomach or cannot keep fluids down. · You have swelling, tingling, pain, or numbness in your toes that does not go away when you raise your knee above the level of your heart. · You do not have a bowel movement after taking a laxative. Where can you learn more? Go to http://linh-nuha.info/. Enter X912 in the search box to learn more about \"Achilles Tendon Repair: What to Expect at Home. \" Current as of: May 23, 2016 Content Version: 11.2 © 3316-4435 [employer]. Care instructions adapted under license by [employer] (which disclaims liability or warranty for this information). If you have questions about a medical condition or this instruction, always ask your healthcare professional. [place] [address] any warranty or liability for your use of this information. Introducing [facility]! [facility] introduces [employer] patient portal. Now you can access parts of your medical record, email your doctor's office, and request medication refills online. 1. In your internet browser, go to [email or web address] 2. Click on the First Time User? Click Here link in the Sign In box. You will see the New Member Sign Up page. 3. Enter your [employer] Access Code exactly as it appears below. You will not need to use this code after youve completed the sign-up process. If you do not sign up before the expiration date, you must request a new code. · [employer] Access Code: [email or web address] Expires: 7/5/2017  2:01 PM 
 
4. Enter the last four digits of your Social Security Number (xxxx) and Date of Birth (mm/dd/yyyy) as indicated and click Submit. You will be taken to the next sign-up page. 5. Create a Consumer Agent Portal (CAP)t ID. This will be your [employer] login ID and cannot be changed, so think of one that is secure and easy to remember. 6. Create a [employer] password. You can change your password at any time. 7. Enter your Password Reset Question and Answer. This can be used at a later time if you forget your password. 8. Enter your e-mail address. You will receive e-mail notification when new information is available in [address] 9. Click Sign Up. You can now view and download portions of your medical record. 10. Click the Download Summary menu link to download a portable copy of your medical information. If you have questions, please visit the Frequently Asked Questions section of the [employer] website. Remember, [employer] is NOT to be used for urgent needs. For medical emergencies, dial 911. Now available from your iPhone and Android! Please provide this summary of care documentation to your next provider. Your primary care clinician is listed as Zina Lind. If you have any questions after today's visit, please call [phone].

## 2017-05-25 ENCOUNTER — HOSPITAL ENCOUNTER (OUTPATIENT)
Dept: PHYSICAL THERAPY | Age: 22
Discharge: HOME | End: 2017-05-25
Payer: COMMERCIAL

## 2017-05-25 PROCEDURE — 97161 PT EVAL LOW COMPLEX 20 MIN: CPT

## 2017-05-25 PROCEDURE — 97140 MANUAL THERAPY 1/> REGIONS: CPT

## 2017-05-25 PROCEDURE — 97110 THERAPEUTIC EXERCISES: CPT

## 2017-05-25 NOTE — PROGRESS NOTES
PT DAILY TREATMENT NOTE/FOOT AND ANKLE EVAL 3-16    Patient Name: Brenden Macedo  Date:2017  : 1995  [x]  Patient  Verified  Payor: BLUE CROSS / Plan: [employer] [facility] / Product Type: PPO /    In time:8:15  Out time: 9:04  Total Treatment Time (min): 48  Visit #: 1 of  neutral     Treatment Area: Pain in right ankle and joints of right foot [M25.571]    SUBJECTIVE  Pain Level (0-10 scale): 0  []constant []intermittent []improving []worsening []no change since onset    Any medication changes, allergies to medications, adverse drug reactions, diagnosis change, or new procedure performed?: [x] No    [] Yes (see summary sheet for update)  Subjective functional status/changes:  Pt s/p (R) achilles tendon tear with surgey in 2017. Subjective:  Chief Complaint/: pain and decr mobility    Onset:  followed by Surgery on 17    Mechanism of Injury: While playing basket ball, he was about to make a move to the basket and felt a pop in his ankle. PMHx/Surgical Hx: None noted    What type of work do you do? N/A     Living Situation: Lives at home with parents in a 2 story home    What type of daily activities/hobbies?  Basketball, hanging out with friends, working out    Limitations to Activity: Not able to participate in most usual activity     Current Health Status:  good  Barriers: []pain []financial []time []transportation []other    Goal: Make the leg stronger and want to be able to walk again    PLOF: Able to play basketball and hang out with friends w/o (R) angle pain,  Walk w/o crutches    Limitations to PLOF: Yes    Motivation: High    Substance use: []Alcohol []Tobacco []other:     FABQ Score: []low []elevate    Cognition: A & O x 3    Other:        OBJECTIVE/EXAMINATION  Domestic Life: Normal  Activity/Recreational Limitations: Yes due to CAM Boot on (R) foot  Mobility: Mod (I) with crutches  Self Care: (I)    Gait - Non WB (R) LE on Crutches  - Unclear of WB status, Will remain Non WB until updated from MD  - Static posture 28* PF and 17* IV in supine  - AROM DF -25* PF 29* 29*  - PROM EV 0* IV 25, PF 45, DF -10* unforced        22 min [x]Eval                  []Re-Eval       16 min Therapeutic Exercise:  [x] See flow sheet : Develop and instruct HEP   Rationale: increase ROM, increase strength and improve coordination to improve the patients ability to tolerate increased activity levels    10 min Manual Therapy:  PROM (R) ankle   Rationale: decrease pain, increase ROM and increase tissue extensibility to tolerate increased activity levels          With   [x] TE   [] TA   [] neuro   [] other: Patient Education: [x] Review HEP    [] Progressed/Changed HEP based on:   [] positioning   [] body mechanics   [] transfers   [] heat/ice application    [] other:      Other Objective/Functional Measures:   - AROM after treatment DF 17, PF 34    Physical Therapy Evaluation  - Foot and Ankle    Gait: [] Normal    [] Abnormal    [] Antalgic    [] NWB    Device:  Describe:    ROM/Strength : See above  [] Unable to assess at this time      AROM        PROM            Strength (1-5)   Left Right Left Right Left  Right   Dorsiflexion         Plantarflexion         Inversion         Eversion         Great Toe Ext         Great Toe Flex           Flexibility: [] Unable to assess at this time  Gastroc:    (L) Tightness [] WNL   [] Min   [] Mod   [] Severe    (R) Tightness [] WNL   [] Min   [] Mod   [] Severe  Soleus:    (L) Tightness [] WNL   [] Min   [] Mod   [] Severe    (R) Tightness [] WNL   [] Min   [] Mod   [] Severe  Other:      (L) Tightness [] WNL   [] Min   [] Mod   [] Severe    (R) Tightness [] WNL   [] Min   [] Mod   [] Severe    Palpation:   Location:  Patient's Pain Response: [] Min   [] Mod   [] Severe  Location:  Patient's Pain Response: [] Min   [] Mod   [] Severe    Optional Tests:  Balance/Stork Test: touches/60sec (L): (R):    Single Leg Hop:   (L) Distance(ft):  (R) Distance(ft): (L)/(R)%:   (L) Time(sec):  (R) Time(sec): (L)/(R)%:      Sub-talor alignment: [] Neutral     [] Pronation      [] Supination    Forefoot alignment:  [] Neutral     [] Varus            [] Valgus    Swelling:   Left (cm) Right (cm)   Figure 8:     Midfoot:      Malleoli Level:     MTH:        Anterior Drawer: [] Neg    [] Pos  Posterior Drawer:  [] Neg    [] Pos  Inversion Stress:  [] Neg    [] Pos  Talar Tilt:   [] Neg    [] Pos  Eversion Stress:  [] Neg    [] Pos  Precious's Sign:  [] Neg    [] Pos  Llamas Test: [] Neg    [] Pos    Other tests/ comments:       Pain Level (0-10 scale) post treatment: 0    ASSESSMENT/Changes in Function:      Patient will continue to benefit from skilled PT services to modify and progress therapeutic interventions, address functional mobility deficits, address ROM deficits, address strength deficits, analyze and address soft tissue restrictions and analyze and cue movement patterns to attain remaining goals. [x]  See Plan of Care  []  See progress note/recertification  []  See Discharge Summary         Progress towards goals / Updated goals:  Short Term Goals: To be accomplished in 2 weeks:  1. Demonstrate (I) with HEP   Eval Status:  Initiated   Current Status:  2. Pt to tolerate 30 min or more of TE and/or Interventions w/o increased s/s   Eval Status:  Initiated   Current Status:  3. Increase DF in (R) Foot to -15 or better for progress to normal ROM    Eval Status:  Initiated   Current Status:      Long Term Goals: To be accomplished in 18 Visits:  1. Pt will Amb on TM 1/4 mile w/o increased s/s   Eval Status:  Initiated   Current Status:  2. Pt will have (R) foot AROM DF to 5* or better for good ankle mobility for a normal gait pattern   Eval Status:  Initiated   Current Status:  3. Pt will report 50% improvement with impairment since start of treatment   Eval Status:  Initiated   Current Status:  4.   Pt will amb 1/2 mile on TM with little to no difficulty to show improvement in function and progress to PLOF   Eval Status:  Initiated   Current Status:  5.   Pt will improve FOTO score to 61 in 16 visits to show significant improvement in function for progress to (I) community ambulation   Eval Status: 28   Current Status:      PLAN  [x]  Upgrade activities as tolerated     [x]  Continue plan of care  []  Update interventions per flow sheet       []  Discharge due to:_  []  Other:_      González Newton PT 5/25/2017  8:12 AM

## 2017-05-25 NOTE — PROGRESS NOTES
[facility]  [address], [address] [address] [address]  [phone] fax    Plan of Care/ Statement of Necessity for Physical Therapy Services    Patient name: Inge Diane Start of Care: 2017   Referral source: Amelia Rabago MD : 1995    Medical Diagnosis: S/P Achilles tendon repair [Z98.890]   Onset Date:17 followed by surgery 17    Treatment Diagnosis: Impaired gait and ankle mobility   Prior Hospitalization: see medical history Provider#: [id number]   Medications: Verified on Patient summary List    Comorbidities: None noted   Prior Level of Function: Able to play basketball and hang out with friends w/o (R) angle pain, Walk w/o crutches     The Plan of Care and following information is based on the information from the initial evaluation. Assessment/ key information: Patient is a 25 y.o. male referred to PT with the above Dx. Patient arrives in a CAM Boot [facility] with crutches. Patient seen today S/P (R) achilles tendon repair. Patient presents to PT with an impaired gait, decreased balance, decreased strength, decreased flexibility, and decreased mobility. Patient s/s appear to be consistent w/ diagnosis. Patient demonstrates the potential to make functional gains within a reasonable time frame. Patient will benefit from skilled PT to address impairments and improve functional mobility, strength, gait and balance for an improved quality of life.   Fall Risk Assessment: Patient demonstrates a low Fall Risk   Evaluation Complexity History MEDIUM  Complexity : 1-2 comorbidities / personal factors will impact the outcome/ POC ; Examination LOW Complexity : 1-2 Standardized tests and measures addressing body structure, function, activity limitation and / or participation in recreation  ;Presentation LOW Complexity : Stable, uncomplicated  ;Clinical Decision Making MEDIUM Complexity : FOTO score of 26-74  Overall Complexity Rating: LOW Problem List: pain affecting function, decrease ROM, decrease strength, impaired gait/ balance, decrease ADL/ functional abilitiies, decrease activity tolerance, decrease flexibility/ joint mobility, decrease transfer abilities and other FOTO = 28   Treatment Plan may include any combination of the following: Therapeutic exercise, Therapeutic activities, Neuromuscular re-education, Physical agent/modality, Gait/balance training, Manual therapy, Patient education, Self Care training and Functional mobility training  Patient / Family readiness to learn indicated by: asking questions, trying to perform skills and interest  Persons(s) to be included in education: patient (P)  Barriers to Learning/Limitations: None  Patient Goal (s): Make the leg stronger and want to be able to walk again  Patient Self Reported Health Status: good  Rehabilitation Potential: good    Short Term Goals: To be accomplished in 5 treatments:  1. Demonstrate (I) with HEP   Eval Status: Initiated   Current Status:  2. Pt to tolerate 30 min or more of TE and/or Interventions w/o increased s/s   Eval Status: Initiated   Current Status:  3. Increase DF in (R) Foot to -15 or better for progress to normal ROM    Eval Status: Initiated   Current Status:  Long Term Goals: To be accomplished in 18 treatments:  1. Pt will Amb on TM 1/4 mile w/o increased s/s   Eval Status: Initiated   Current Status:  2. Pt will have (R) foot AROM DF to 5* or better for good ankle mobility for a normal gait pattern   Eval Status: Initiated   Current Status:  3. Pt will report 50% improvement with impairment since start of treatment   Eval Status: Initiated   Current Status:  4. Pt will amb 1/2 mile on TM with little to no difficulty to show improvement in function and progress to PLOF   Eval Status: Initiated   Current Status:  5.   Pt will improve FOTO score to 61 in 16 visits to show significant improvement in function for progress to (I) community ambulation   Eval Status: 28   Current Status:  Frequency / Duration: Patient to be seen 2-3 times per week for 4-6 weeks. Patient/ Caregiver education and instruction: Diagnosis, prognosis, self care, activity modification and exercises   Comments:  Patient provided w/HEP   [x]  Plan of care has been reviewed with JOCELYN Blackmon, PT 5/25/2017 2:38 PM  ________________________________________________________________________    I certify that the above Therapy Services are being furnished while the patient is under my care. I agree with the treatment plan and certify that this therapy is necessary.     [de-identified] Signature:____________________  Date:____________Time: _________    Please sign and return to [facility] - ALEX FRANZ [address], [address] [place] [address],[facility] [address] [phone] fax

## 2017-05-30 ENCOUNTER — HOSPITAL ENCOUNTER (OUTPATIENT)
Dept: PHYSICAL THERAPY | Age: 22
Discharge: HOME | End: 2017-05-30
Payer: COMMERCIAL

## 2017-05-30 PROCEDURE — 97110 THERAPEUTIC EXERCISES: CPT

## 2017-05-30 PROCEDURE — 97140 MANUAL THERAPY 1/> REGIONS: CPT

## 2017-05-30 NOTE — PROGRESS NOTES
PT DAILY TREATMENT NOTE 3-16    Patient Name: Skyler Records  Date:2017  : 1995  [x]  Patient  Verified  Payor: BLUE CROSS / Plan: [facility] / Product Type: PPO /    In time:1100  Out time:1149  Total Treatment Time (min): 35  Visit #: 2 of 12    Treatment Area: Pain in right ankle and joints of right foot [M25.571]    SUBJECTIVE  Pain Level (0-10 scale): 18  Any medication changes, allergies to medications, adverse drug reactions, diagnosis change, or new procedure performed?: [x] No    [] Yes (see summary sheet for update)  Subjective functional status/changes:   [] No changes reported  Doing fine,    OBJECTIVE   Modality rationale: increase tissue extensibility to improve the patients ability to perform increased ADL   Min Type Additional Details    [] Estim:  []Unatt       []IFC  []Premod                        []Other:  []w/ice   []w/heat  Position:  Location:    [] Estim: []Att    []TENS instruct  []NMES                    []Other:  []w/US   []w/ice   []w/heat  Position:  Location:    []  Traction: [] Cervical       []Lumbar                       [] Prone          []Supine                       []Intermittent   []Continuous Lbs:  [] before manual  [] after manual    []  Ultrasound: []Continuous   [] Pulsed                           []1MHz   []3MHz Location:  W/cm2:    []  Iontophoresis with dexamethasone         Location: [] Take home patch   [] In clinic   10 []  Ice     [x]  heat  []  Ice massage  []  Laser   []  Anodyne Position: Prone  Location:(R) Achilles    []  Laser with stim  []  Other: Position:  Location:    []  Vasopneumatic Device Pressure:       [] lo [] med [] hi   Temperature: [] lo [] med [] hi   [x] Skin assessment post-treatment:  []intact []redness- no adverse reaction    []redness  adverse reaction:   15 min Therapeutic Exercise:  [x] See flow sheet :   Rationale: increase ROM, increase strength and improve coordination to improve the patients ability to tolerate increased activity levels  10 min Manual Therapy:  STM/DTM (R) Achilles   Rationale: increase ROM, increase tissue extensibility and decrease trigger points to . With   [x] TE   [] TA   [] neuro   [] other: Patient Education: [x] Review HEP    [] Progressed/Changed HEP based on:   [] positioning   [] body mechanics   [] transfers   [] heat/ice application    [] other:      Other Objective/Functional Measures:   - AROM DF -16 PF 40     Pain Level (0-10 scale) post treatment: 0    ASSESSMENT/Changes in Function:      Patient will continue to benefit from skilled PT services to modify and progress therapeutic interventions, address functional mobility deficits, address ROM deficits, address strength deficits, analyze and address soft tissue restrictions and analyze and cue movement patterns to attain remaining goals. []  See Plan of Care  []  See progress note/recertification  []  See Discharge Summary         Progress towards goals / Updated goals:  Short Term Goals: To be accomplished in 5 treatments:  1. Demonstrate (I) with HEP  Eval Status: Initiated  Current Status:  2. Pt to tolerate 30 min or more of TE and/or Interventions w/o increased s/s  Eval Status: Initiated  Current Status: Progressing at 35 min today  3. Increase DF in (R) Foot to -15 or better for progress to normal ROM   Eval Status: Initiated  Current Status: Good progress at -16 today 5/30/17    Long Term Goals: To be accomplished in 18 treatments:  1. Pt will Amb on TM 1/4 mile w/o increased s/s  Eval Status: Initiated  Current Status:  2. Pt will have (R) foot AROM DF to 5* or better for good ankle mobility for a normal gait pattern  Eval Status: Initiated  Current Status:  3. Pt will report 50% improvement with impairment since start of treatment  Eval Status: Initiated  Current Status:  4.  Pt will amb 1/2 mile on TM with little to no difficulty to show improvement in function and progress to PLOF  Eval Status: Initiated  Current Status:  5.  Pt will improve FOTO score to 61 in 16 visits to show significant improvement in function for progress to (I) community ambulation  Eval Status: 28  Current Status:    PLAN  [x]  Upgrade activities as tolerated     [x]  Continue plan of care  []  Update interventions per flow sheet       []  Discharge due to:_  []  Other:_      Ricardo Barber, PT 5/30/2017  11:45 AM    Future Appointments  Date Time Provider Jailyn Kassie   6/1/2017 12:30 PM Maggie Negron, PTA [facility]   6/2/2017 8:30 AM Vernon Scruggs, PT [facility]   6/5/2017 10:00 AM Ricardo Barber, PT [facility]   6/7/2017 9:00 AM Vernon Scruggs, PT [facility]   6/9/2017 9:30 AM Ricardo Barber, PT [facility]   6/12/2017 10:30 AM Maggie Negron, PTA [facility]   6/13/2017 8:20 AM Renzo Marcos MD [place] [address]   6/14/2017 9:00 AM Ricardo Barber, PT [facility]   6/16/2017 9:00 AM Ricardo Barber, PT [facility]   6/19/2017 10:30 AM Maggie Negron, PTA [facility]   6/21/2017 9:00 AM Vernon Scruggs, PT [facility]   6/23/2017 9:00 AM Ricardo Barber, PT [facility]   6/26/2017 9:00 AM Maggie Negron, PTA [facility]   6/28/2017 9:00 AM Vernon Scruggs, PT [facility]   6/30/2017 9:00 AM [facility]

## 2017-06-01 ENCOUNTER — HOSPITAL ENCOUNTER (OUTPATIENT)
Dept: PHYSICAL THERAPY | Age: 22
Discharge: HOME | End: 2017-06-01
Payer: COMMERCIAL

## 2017-06-01 PROCEDURE — 97140 MANUAL THERAPY 1/> REGIONS: CPT

## 2017-06-01 PROCEDURE — 97110 THERAPEUTIC EXERCISES: CPT

## 2017-06-01 NOTE — PROGRESS NOTES
PT DAILY TREATMENT NOTE - [facility]     Patient Name: Clarissa Tamayo  Date:2017  : 1995  [x]  Patient  Verified  Payor: BLUE CROSS / Plan: [employer] [facility] / Product Type: PPO /    In time:12:30  Out time:1:18  Total Treatment Time (min): 40  Visit #: 3 of 12    Treatment Area: Pain in right ankle and joints of right foot [M25.571]    SUBJECTIVE  Pain Level (0-10 scale): 0  Any medication changes, allergies to medications, adverse drug reactions, diagnosis change, or new procedure performed?: [x] No    [] Yes (see summary sheet for update)  Subjective functional status/changes:   [] No changes reported  Feeling ok.     OBJECTIVE    Modality rationale: decrease edema, decrease inflammation, decrease pain and increase tissue extensibility to improve the patients ability to perform ADL   Min Type Additional Details    [] Estim:  []Unatt       []IFC  []Premod                        []Other:  []w/ice   []w/heat  Position:  Location:    [] Estim: []Att    []TENS instruct  []NMES                    []Other:  []w/US   []w/ice   []w/heat  Position:  Location:    []  Traction: [] Cervical       []Lumbar                       [] Prone          []Supine                       []Intermittent   []Continuous Lbs:  [] before manual  [] after manual    []  Ultrasound: []Continuous   [] Pulsed                           []1MHz   []3MHz W/cm2:  Location:    []  Iontophoresis with dexamethasone         Location: [] Take home patch   [] In clinic   10 [x]  Ice   post  []  heat  []  Ice massage  []  Laser   []  Anodyne Position:prone  Location:(R) achilles    []  Laser with stim  []  Other:  Position:  Location:    []  Vasopneumatic Device Pressure:       [] lo [] med [] hi   Temperature: [] lo [] med [] hi   [x] Skin assessment post-treatment:  [x]intact []redness- no adverse reaction    []redness  adverse reaction:      min []Eval                  []Re-Eval       20 min Therapeutic Exercise:  [x] See flow sheet : Rationale: increase ROM and increase strength to improve the patients ability to perform ADL     min Therapeutic Activity:  []  See flow sheet :   Rationale:   to improve the patients ability to       min Neuromuscular Re-education:  []  See flow sheet :   Rationale:   to improve the patients ability to     10 min Manual Therapy:  CFM/effleurage  (R)  achilles   Rationale: decrease pain, increase ROM, increase tissue extensibility and decrease edema  to perform ADL     min Gait Training:  ___ feet with ___ device on level surfaces with ___ level of assist   Rationale: With   [x] TE   [] TA   [] neuro   [] other: Patient Education: [x] Review HEP    [] Progressed/Changed HEP based on:   [] positioning   [] body mechanics   [] transfers   [] heat/ice application    [] other:      Other Objective/Functional Measures:  Tightness  (R) achilles    Pain Level (0-10 scale) post treatment: 0    ASSESSMENT/Changes in Function: Challenged  With bike. Fair  Response  To remaining there ex. Patient will continue to benefit from skilled PT services to address functional mobility deficits, address ROM deficits, address strength deficits, analyze and address soft tissue restrictions, analyze and cue movement patterns and instruct in home and community integration to attain remaining goals. [x]  See Plan of Care  []  See progress note/recertification  []  See Discharge Summary         Progress towards goals / Updated goals:  Short Term Goals: To be accomplished in 5 treatments:  1. Demonstrate (I) with HEP  Eval Status: Initiated  Current Status:  2. Pt to tolerate 30 min or more of TE and/or Interventions w/o increased s/s  Eval Status: Initiated  Current Status: Progressing at 35 min today  3. Increase DF in (R) Foot to -15 or better for progress to normal ROM   Eval Status: Initiated  Current Status: Good progress at -16 today 5/30/17     Long Term Goals: To be accomplished in 18 treatments:  1.  Pt will Amb on TM 1/4 mile w/o increased s/s  Eval Status: Initiated  Current Status:  2. Pt will have (R) foot AROM DF to 5* or better for good ankle mobility for a normal gait pattern  Eval Status: Initiated  Current Status:  3. Pt will report 50% improvement with impairment since start of treatment  Eval Status: Initiated  Current Status:  4. Pt will amb 1/2 mile on TM with little to no difficulty to show improvement in function and progress to PLOF  Eval Status: Initiated  Current Status:  5.  Pt will improve FOTO score to 61 in 16 visits to show significant improvement in function for progress to (I) community ambulation  Eval Status: 28  Current Status:    PLAN  []  Upgrade activities as tolerated     [x]  Continue plan of care  []  Update interventions per flow sheet       []  Discharge due to:_  []  Other:_      Maggie Negron PTA 6/1/2017  12:51 PM    Future Appointments  Date Time Provider Jailyn [place]   6/2/2017 8:30 AM Vernon Scruggs, PT [facility]   6/5/2017 10:00 AM Ricardo Barber, PT [facility]   6/7/2017 9:00 AM Vernon Scruggs, PT [facility]   6/9/2017 9:30 AM Ricardo Barber, PT [facility]   6/12/2017 10:30 AM Maggie Negron PTA [facility]   6/13/2017 8:20 AM Renzo Marcos MD [place] [address]   6/14/2017 9:00 AM Ricardo Barber, PT [facility]   6/16/2017 9:00 AM Ricardo Barber, PT [facility]   6/19/2017 10:30 AM Maggie Negron, PTA [facility]   6/21/2017 9:00 AM Vernon Scruggs, PT [facility]   6/23/2017 9:00 AM Ricardo Barber, PT [facility]   6/26/2017 9:00 AM Maggie Negron, JOCELYN [facility]   6/28/2017 9:00 AM Vernon Scruggs, PT [facility]   6/30/2017 9:00 AM Ricardo Barber, PT [facility]

## 2017-06-02 ENCOUNTER — HOSPITAL ENCOUNTER (OUTPATIENT)
Dept: PHYSICAL THERAPY | Age: 22
Discharge: HOME | End: 2017-06-02
Payer: COMMERCIAL

## 2017-06-02 PROCEDURE — 97140 MANUAL THERAPY 1/> REGIONS: CPT

## 2017-06-02 PROCEDURE — 97110 THERAPEUTIC EXERCISES: CPT

## 2017-06-02 NOTE — PROGRESS NOTES
PT DAILY TREATMENT NOTE     Patient Name: Kunal York  Date:2017  : 1995  [x]  Patient  Verified  Payor: BLUE CROSS / Plan: [employer] [facility] [employer] / Product Type: PPO /    In time:837 Out time:927  Total Treatment Time (min): 40  Visit #: 4 of     Treatment Area: Pain in right ankle and joints of right foot [M25.571]    SUBJECTIVE  Pain Level (0-10 scale): 0  Any medication changes, allergies to medications, adverse drug reactions, diagnosis change, or new procedure performed?: [x] No    [] Yes (see summary sheet for update)  Subjective functional status/changes:   [] No changes reported  I am not having any pain.     OBJECTIVE    Modality rationale: decrease edema, decrease inflammation and increase tissue extensibility to improve the patients ability to aid with increase tolerance to ADLS and activities   Min Type Additional Details    [] Estim:  []Unatt       []IFC  []Premod                        []Other:  []w/ice   []w/heat  Position:  Location:    [] Estim: []Att    []TENS instruct  []NMES                    []Other:  []w/US   []w/ice   []w/heat  Position:  Location:    []  Traction: [] Cervical       []Lumbar                       [] Prone          []Supine                       []Intermittent   []Continuous Lbs:  [] before manual  [] after manual    []  Ultrasound: []Continuous   [] Pulsed                           []1MHz   []3MHz W/cm2:  Location:    []  Iontophoresis with dexamethasone         Location: [] Take home patch   [] In clinic   10 [x]  Ice  post   []  heat  []  Ice massage  []  Laser   []  Anodyne Position:reclined  Location:R ankle foot    []  Laser with stim  []  Other:  Position:  Location:    []  Vasopneumatic Device Pressure:       [] lo [] med [] hi   Temperature: [] lo [] med [] hi   [] Skin assessment post-treatment:  []intact []redness- no adverse reaction    []redness  adverse reaction:      min []Eval                  []Re-Eval       15 min Therapeutic Exercise:  [] See flow sheet :   Rationale: increase ROM and improve coordination to improve the patients ability to aid with increase tolerance to ADLs and activiites     min Therapeutic Activity:  []  See flow sheet :   Rationale:   to improve the patients ability to       min Neuromuscular Re-education:  []  See flow sheet :   Rationale:   to improve the patients ability to     15 min Manual Therapy: effluerage, Scar massage, gentle ankle mobs , PROM all planes , gentle passive DF stretch    Rationale: increase ROM and increase tissue extensibility to aid with increase tolerance to ADLS and activities     min Gait Training:  ___ feet with ___ device on level surfaces with ___ level of assist   Rationale: With   [] TE   [] TA   [] neuro   [] other: Patient Education: [x] Review HEP    [] Progressed/Changed HEP based on:   [] positioning   [] body mechanics   [] transfers   [] heat/ice application    [] other:      Other Objective/Functional Measures: VC exercises and technique     Pain Level (0-10 scale) post treatment: 0    ASSESSMENT/Changes in Function: tolerated well. No  DF to N within protocol. Patient will continue to benefit from skilled PT services to modify and progress therapeutic interventions, address functional mobility deficits, address ROM deficits, address strength deficits, analyze and address soft tissue restrictions, analyze and cue movement patterns, analyze and modify body mechanics/ergonomics, assess and modify postural abnormalities and instruct in home and community integration to attain remaining goals. [x]  See Plan of Care  []  See progress note/recertification  []  See Discharge Summary         Progress towards goals / Updated goals:  Short Term Goals: To be accomplished in 5 treatments:  1. Demonstrate (I) with HEP  Eval Status: Initiated  Current Status: Met 6/2/17  2.  Pt to tolerate 30 min or more of TE and/or Interventions w/o increased s/s  Eval Status: Initiated  Current Status: Progressing at 15 minutes 6/2/17  3. Increase DF in (R) Foot to -15 or better for progress to normal ROM   Eval Status: Initiated  Current Status: Good progress at -16 today 5/30/17      Long Term Goals: To be accomplished in 18 treatments:  1. Pt will Amb on TM 1/4 mile w/o increased s/s  Eval Status: Initiated  Current Status:  2. Pt will have (R) foot AROM DF to 5* or better for good ankle mobility for a normal gait pattern  Eval Status: Initiated  Current Status:  3. Pt will report 50% improvement with impairment since start of treatment  Eval Status: Initiated  Current Status:  4. Pt will amb 1/2 mile on TM with little to no difficulty to show improvement in function and progress to PLOF  Eval Status: Initiated  Current Status:  5.  Pt will improve FOTO score to 61 in 16 visits to show significant improvement in function for progress to (I) community ambulation  Eval Status: 28  Current Status:recheck next session     PLAN  [x]  Upgrade activities as tolerated     [x]  Continue plan of care  []  Update interventions per flow sheet       []  Discharge due to:_  [x]  [address], PT 6/2/2017  9:17 AM    Future Appointments  Date Time Provider Jailyn Fernando   6/5/2017 10:00 AM Emery Ozuna PT [facility]   6/7/2017 9:00 AM Ramirez Marx, PT [facility]   6/9/2017 9:30 AM Emery Ozuna PT [facility]   6/12/2017 10:30 AM Maggie Negron PTA [facility]   6/13/2017 8:20 AM MD Mirella Menchaca [facility]   6/14/2017 9:00 AM Emery Ozuna PT [facility]   6/16/2017 9:00 AM Emery Ozuna PT [facility]   6/19/2017 10:30 AM Maggie Negron, PTA [facility]   6/21/2017 9:00 AM Ramirez Marx, PT [facility]   6/23/2017 9:00 AM Emery Ozuna, PT [facility]   6/26/2017 9:00 AM Maggie Negron, PTA [facility]   6/28/2017 9:00 AM Ramirez Marx, PT [facility]   6/30/2017 9:00 AM Emery Ozuna, PT [facility]

## 2017-06-05 ENCOUNTER — HOSPITAL ENCOUNTER (OUTPATIENT)
Dept: PHYSICAL THERAPY | Age: 22
Discharge: HOME | End: 2017-06-05
Payer: COMMERCIAL

## 2017-06-05 PROCEDURE — 97110 THERAPEUTIC EXERCISES: CPT

## 2017-06-05 PROCEDURE — 97140 MANUAL THERAPY 1/> REGIONS: CPT

## 2017-06-05 NOTE — PROGRESS NOTES
PT DAILY TREATMENT NOTE 3-16    Patient Name: Brenden Macedo  Date:2017  : 1995  [x]  Patient  Verified  Payor: BLUE CROSS / Plan: [employer] [facility] [place] / Product Type: PPO /    In time:1015  Out time:1109  Total Treatment Time (min): 30  Visit #: 5 of 18     Treatment Area: Pain in right ankle and joints of right foot [M25.571]    SUBJECTIVE  Pain Level (0-10 scale): 0  Any medication changes, allergies to medications, adverse drug reactions, diagnosis change, or new procedure performed?: [x] No    [] Yes (see summary sheet for update)  Subjective functional status/changes:   [] No changes reported  Doing good    OBJECTIVE    18 min Therapeutic Exercise:  [x] See flow sheet :   Rationale: increase ROM, increase strength and improve coordination to improve the patients ability to tolerate increased activity levels  12 min Manual Therapy:  STM/DTM (R) Achilles, DF Str   Rationale: decrease pain, increase ROM, increase tissue extensibility and decrease trigger points to perform increased ADL           With   [x] TE   [] TA   [] neuro   [] other: Patient Education: [x] Review HEP    [] Progressed/Changed HEP based on:   [] positioning   [] body mechanics   [] transfers   [] heat/ice application    [] other:      Other Objective/Functional Measures:   - AROM (R) Ankle DF -11 PF 50  - Add towel calf str       Pain Level (0-10 scale) post treatment: 0    ASSESSMENT/Changes in Function:      Patient will continue to benefit from skilled PT services to modify and progress therapeutic interventions, address functional mobility deficits, address ROM deficits, address strength deficits, analyze and address soft tissue restrictions and analyze and cue movement patterns to attain remaining goals. []  See Plan of Care  []  See progress note/recertification  []  See Discharge Summary         Progress towards goals / Updated goals:  Short Term Goals: To be accomplished in 5 treatments:  1.  Demonstrate (I) with HEP  Eval Status: Initiated  Current Status: Met 6/2/17  2. Pt to tolerate 30 min or more of TE and/or Interventions w/o increased s/s  Eval Status: Initiated  Current Status: Progressing at 18 minutes 6/5/17  3. Increase DF in (R) Foot to -15 or better for progress to normal ROM   Eval Status: Initiated  Current Status: Good progress at -11 today 6/5/17      Long Term Goals: To be accomplished in 18 treatments:  1. Pt will Amb on TM 1/4 mile w/o increased s/s  Eval Status: Initiated  Current Status:  2. Pt will have (R) foot AROM DF to 5* or better for good ankle mobility for a normal gait pattern  Eval Status: Initiated  Current Status: Progressing at -11  3. Pt will report 50% improvement with impairment since start of treatment  Eval Status: Initiated  Current Status:  4. Pt will amb 1/2 mile on TM with little to no difficulty to show improvement in function and progress to PLOF  Eval Status: Initiated  Current Status:  5.  Pt will improve FOTO score to 61 in 16 visits to show significant improvement in function for progress to (I) community ambulation  Eval Status: 28  Current Status: Progressing at 32 6/5/17     PLAN  [x]  Upgrade activities as tolerated     [x]  Continue plan of care  []  Update interventions per flow sheet       []  Discharge due to:_  []  Other:_      Pee Milner PT 6/5/2017  10:50 AM    Future Appointments  Date Time Provider Jailyn Fontenotisti   6/7/2017 9:00 AM Irving Hicks, PT [facility]   6/9/2017 9:30 AM Pee Milner PT [facility]   6/12/2017 10:30 AM Maggie Negron PTA [facility]   6/13/2017 8:20 AM Natacha Briones MD [place] [id number]   6/14/2017 9:00 AM Pee Milner PT [facility]   6/16/2017 9:00 AM Pee Milner PT [facility]   6/19/2017 10:30 AM Maggie Negron PTA [facility]   6/21/2017 9:00 AM Irving Hicks, PT [facility]   6/23/2017 9:00 AM Pee Milner, PT [facility]   6/26/2017 9:00 AM Maggie Negron PTA [facility]   6/28/2017 9:00 AM Christina Foley, PT [facility]   6/30/2017 9:00 AM Claudette Bower PT [facility]

## 2017-06-07 ENCOUNTER — HOSPITAL ENCOUNTER (OUTPATIENT)
Dept: PHYSICAL THERAPY | Age: 22
Discharge: HOME | End: 2017-06-07
Payer: COMMERCIAL

## 2017-06-07 PROCEDURE — 97110 THERAPEUTIC EXERCISES: CPT

## 2017-06-07 PROCEDURE — 97140 MANUAL THERAPY 1/> REGIONS: CPT

## 2017-06-07 NOTE — PROGRESS NOTES
PT DAILY TREATMENT NOTE     Patient Name: Leila Harrison  Date:2017  : 1995  [x]  Patient  Verified  Payor: BLUE CROSS / Plan: [employer] [facility] / Product Type: PPO /    In time:907  Out time:950  Total Treatment Time (min): 43  Visit #: 6 of     Treatment Area: Pain in right ankle and joints of right foot [M25.571]    SUBJECTIVE  Pain Level (0-10 scale): 0  Any medication changes, allergies to medications, adverse drug reactions, diagnosis change, or new procedure performed?: [x] No    [] Yes (see summary sheet for update)  Subjective functional status/changes:   [] No changes reported  It's doing all right. It feels like a lump in my foot (arch). OBJECTIVE    Modality rationale: decrease edema, decrease inflammation and increase tissue extensibility to improve the patients ability to aid with increase tolerance to ADLS and activities.    Min Type Additional Details    [] Estim:  []Unatt       []IFC  []Premod                        []Other:  []w/ice   []w/heat  Position:  Location:    [] Estim: []Att    []TENS instruct  []NMES                    []Other:  []w/US   []w/ice   []w/heat  Position:  Location:    []  Traction: [] Cervical       []Lumbar                       [] Prone          []Supine                       []Intermittent   []Continuous Lbs:  [] before manual  [] after manual    []  Ultrasound: []Continuous   [] Pulsed                           []1MHz   []3MHz W/cm2:  Location:    []  Iontophoresis with dexamethasone         Location: [] Take home patch   [] In clinic   10 [x]  Ice    Post  []  heat  []  Ice massage  []  Laser   []  Anodyne Position:reclined  Location:R ankle foot    []  Laser with stim  []  Other:  Position:  Location:    []  Vasopneumatic Device Pressure:       [] lo [] med [] hi   Temperature: [] lo [] med [] hi   [] Skin assessment post-treatment:  []intact []redness- no adverse reaction    []redness  adverse reaction:      min []Eval []Re-Eval       13 min Therapeutic Exercise:  [x] See flow sheet :   Rationale: increase ROM and improve coordination to improve the patients ability to aid with increase tolerance to ADLs and activities     min Therapeutic Activity:  []  See flow sheet :   Rationale:   to improve the patients ability to       min Neuromuscular Re-education:  []  See flow sheet :   Rationale:   to improve the patients ability to     20 min Manual Therapy:  Scar massage, PROM, effleurage, DF stretch, gentle mobs. Rationale: increase ROM, increase tissue extensibility and decrease trigger points to aid with increase tolerance to ADLS     min Gait Training:  ___ feet with ___ device on level surfaces with ___ level of assist   Rationale: With   [] TE   [] TA   [] neuro   [] other: Patient Education: [x] Review HEP    [] Progressed/Changed HEP based on:   [] positioning   [] body mechanics   [] transfers   [] heat/ice application    [] other:      Other Objective/Functional Measures: VC exercises and tech     Pain Level (0-10 scale) post treatment: 0    ASSESSMENT/Changes in Function: tolerated well. Patient will continue to benefit from skilled PT services to modify and progress therapeutic interventions, address functional mobility deficits, address ROM deficits, address strength deficits, analyze and address soft tissue restrictions, analyze and cue movement patterns, analyze and modify body mechanics/ergonomics, assess and modify postural abnormalities and instruct in home and community integration to attain remaining goals. [x]  See Plan of Care  []  See progress note/recertification  []  See Discharge Summary         Progress towards goals / Updated goals:  Short Term Goals: To be accomplished in 5 treatments:  1. Demonstrate (I) with HEP  Eval Status: Initiated  Current Status: Met 6/2/17 6/7/17  2.  Pt to tolerate 30 min or more of TE and/or Interventions w/o increased s/s  Eval Status: Initiated  Current Status: Progressing at 18 minutes 6/5/17  3. Increase DF in (R) Foot to -15 or better for progress to normal ROM   Eval Status: Initiated  Current Status: Good progress at -11 today 6/5/17      Long Term Goals: To be accomplished in 18 treatments:  1. Pt will Amb on TM 1/4 mile w/o increased s/s  Eval Status: Initiated  Current Status:  2. Pt will have (R) foot AROM DF to 5* or better for good ankle mobility for a normal gait pattern  Eval Status: Initiated  Current Status: Progressing at -11  3. Pt will report 50% improvement with impairment since start of treatment  Eval Status: Initiated  Current Status:  4. Pt will amb 1/2 mile on TM with little to no difficulty to show improvement in function and progress to PLOF  Eval Status: Initiated  Current Status:  5.  Pt will improve FOTO score to 61 in 16 visits to show significant improvement in function for progress to (I) community ambulation  Eval Status: 28  Current Status: Progressing at 32 6/5/17       PLAN  [x]  Upgrade activities as tolerated     [x]  Continue plan of care  []  Update interventions per flow sheet       []  Discharge due to:_  []  Other:_      Susie Pallas, PT 6/7/2017  9:07 AM    Future Appointments  Date Time Provider Jailyn Fernando   6/9/2017 9:30 AM Tiana Vee PT [facility]   6/12/2017 10:30 AM Maggie Negron PTA [facility]   6/13/2017 8:20 AM Aby Sanchez MD [place] [id number]   6/14/2017 9:00 AM Tiana Vee PT [facility]   6/16/2017 9:00 AM Tiana Vee PT [facility]   6/19/2017 10:30 AM Maggie Negron PTA [facility]   6/21/2017 9:00 AM Susie Pallas, PT [facility]   6/23/2017 9:00 AM Tiana Vee PT [facility]   6/26/2017 9:00 AM Maggie Negron, PTA [facility]   6/28/2017 9:00 AM Susie Pallas, PT [facility]   6/30/2017 9:00 AM Tiana Vee, PT [facility]

## 2017-06-09 ENCOUNTER — HOSPITAL ENCOUNTER (OUTPATIENT)
Dept: PHYSICAL THERAPY | Age: 22
Discharge: HOME | End: 2017-06-09
Payer: COMMERCIAL

## 2017-06-09 PROCEDURE — 97110 THERAPEUTIC EXERCISES: CPT

## 2017-06-09 PROCEDURE — 97140 MANUAL THERAPY 1/> REGIONS: CPT

## 2017-06-09 NOTE — PROGRESS NOTES
PT DAILY TREATMENT NOTE 3-16    Patient Name: Annemarie Mancilla  Date:2017  : 1995  [x]  Patient  Verified  Payor: BLUE CROSS / Plan: [employer] [facility] / Product Type: PPO /    In [id number] Out time:1013  Total Treatment Time (min): 36  Visit #: 7 of      Treatment Area: Pain in right ankle and joints of right foot [M25.571]    SUBJECTIVE  Pain Level (0-10 scale): 0  Any medication changes, allergies to medications, adverse drug reactions, diagnosis change, or new procedure performed?: [x] No    [] Yes (see summary sheet for update)  Subjective functional status/changes:   [] No changes reported  Doing good    OBJECTIVE    24 min Therapeutic Exercise:  [x] See flow sheet :   Rationale: increase ROM, increase strength and improve coordination to improve the patients ability to tolerate increased activity levels  10 min Manual Therapy:  DF Str, (R) ankle mobs   Rationale: decrease pain, increase ROM, increase tissue extensibility and decrease trigger points to perform increased ADL           With   [x] TE   [] TA   [] neuro   [] other: Patient Education: [x] Review HEP    [] Progressed/Changed HEP based on:   [] positioning   [] body mechanics   [] transfers   [] heat/ice application    [] other:      Other Objective/Functional Measures:   - AROM (R) Ankle DF -8   - Add Bike with good tolerance for full rotation       Pain Level (0-10 scale) post treatment: 0    ASSESSMENT/Changes in Function:      Patient will continue to benefit from skilled PT services to modify and progress therapeutic interventions, address functional mobility deficits, address ROM deficits, address strength deficits, analyze and address soft tissue restrictions and analyze and cue movement patterns to attain remaining goals. []  See Plan of Care  []  See progress note/recertification  []  See Discharge Summary         Progress towards goals / Updated goals:  Short Term Goals: To be accomplished in 5 treatments:  1. Demonstrate (I) with HEP  Eval Status: Initiated  Current Status: Met 6/2/17  2. Pt to tolerate 30 min or more of TE and/or Interventions w/o increased s/s  Eval Status: Initiated  Current Status: Progressing at 18 minutes 6/5/17  3. Increase DF in (R) Foot to -15 or better for progress to normal ROM   Eval Status: Initiated  Current Status: Good progress at -8 today 6/9/17      Long Term Goals: To be accomplished in 18 treatments:  1. Pt will Amb on TM 1/4 mile w/o increased s/s  Eval Status: Initiated  Current Status:  2. Pt will have (R) foot AROM DF to 5* or better for good ankle mobility for a normal gait pattern  Eval Status: Initiated  Current Status: Progressing at -8 today 6/9/17  3. Pt will report 50% improvement with impairment since start of treatment  Eval Status: Initiated  Current Status:  4. Pt will amb 1/2 mile on TM with little to no difficulty to show improvement in function and progress to PLOF  Eval Status: Initiated  Current Status:  5.  Pt will improve FOTO score to 61 in 16 visits to show significant improvement in function for progress to (I) community ambulation  Eval Status: 28  Current Status: Progressing at 32 6/5/17     PLAN  [x]  Upgrade activities as tolerated     [x]  Continue plan of care  []  Update interventions per flow sheet       []  Discharge due to:_  []  Other:_      Violeta Purcell PT 6/9/2017  9:57 AM    Future Appointments  Date Time Provider Jailyn Riverai   6/12/2017 10:30 AM Maggie Oconnor PTA [facility]   6/13/2017 8:20 AM MD Aracelis Velez [address]   6/14/2017 9:00 AM Violeta Purcell PT [facility]   6/16/2017 9:00 AM [facility] Purcell [facility]   6/19/2017 10:30 AM Maggie Negron PTA [facility]   6/21/2017 9:00 AM Cathy Reyes PT [facility]   6/23/2017 9:00 AM Violeta Purcell, PT [facility]   6/26/2017 9:00 AM Maggie Negron, PTA [facility]   6/28/2017 9:00 AM Cathy Reyes, PT [facility]   6/30/2017 9:00 AM Violeta Purcell, PT [facility]

## 2017-06-12 ENCOUNTER — HOSPITAL ENCOUNTER (OUTPATIENT)
Dept: PHYSICAL THERAPY | Age: 22
Discharge: HOME | End: 2017-06-12
Payer: COMMERCIAL

## 2017-06-12 PROCEDURE — 97140 MANUAL THERAPY 1/> REGIONS: CPT

## 2017-06-12 PROCEDURE — 97110 THERAPEUTIC EXERCISES: CPT

## 2017-06-12 NOTE — PROGRESS NOTES
PT DAILY TREATMENT NOTE - [facility]     Patient Name: Leila Harrison  Date:2017  : 1995  [x]  Patient  Verified  Payor: BLUE CROSS / Plan: [facility] / Product Type: PPO /    In time:10:37  Out time:11:31  Total Treatment Time (min): 26  Visit #: 8 of     Treatment Area: Pain in right ankle and joints of right foot [M25.571]    SUBJECTIVE  Pain Level (0-10 scale): 0  Any medication changes, allergies to medications, adverse drug reactions, diagnosis change, or new procedure performed?: [x] No    [] Yes (see summary sheet for update)  Subjective functional status/changes:   [] No changes reported  No pain.     OBJECTIVE    Modality rationale: decrease edema, decrease inflammation, decrease pain and increase tissue extensibility to improve the patients ability to perform ADL    Min Type Additional Details    [] Estim:  []Unatt       []IFC  []Premod                        []Other:  []w/ice   []w/heat  Position:  Location:    [] Estim: []Att    []TENS instruct  []NMES                    []Other:  []w/US   []w/ice   []w/heat  Position:  Location:    []  Traction: [] Cervical       []Lumbar                       [] Prone          []Supine                       []Intermittent   []Continuous Lbs:  [] before manual  [] after manual    []  Ultrasound: []Continuous   [] Pulsed                           []1MHz   []3MHz W/cm2:  Location:    []  Iontophoresis with dexamethasone         Location: [] Take home patch   [] In clinic    []  Ice     []  heat  []  Ice massage  []  Laser   []  Anodyne Position:  Location:    []  Laser with stim  []  Other:  Position:  Location:    []  Vasopneumatic Device Pressure:       [] lo [] med [] hi   Temperature: [] lo [] med [] hi   [x] Skin assessment post-treatment:  [x]intact []redness- no adverse reaction    []redness  adverse reaction:      min []Eval                  []Re-Eval       18 min Therapeutic Exercise:  [x] See flow sheet :   Rationale: increase ROM and increase strength to improve the patients ability to perform ADL     min Therapeutic Activity:  []  See flow sheet :   Rationale:   to improve the patients ability to       min Neuromuscular Re-education:  []  See flow sheet :   Rationale:   to improve the patients ability to     8 min Manual Therapy:  DTM/Effleurage/passive  Stretch DF   Rationale: decrease pain, increase ROM, increase tissue extensibility and decrease edema  to perform ADL      min Gait Training:  ___ feet with ___ device on level surfaces with ___ level of assist   Rationale: With   [x] TE   [] TA   [] neuro   [] other: Patient Education: [x] Review HEP    [] Progressed/Changed HEP based on:   [] positioning   [] body mechanics   [] transfers   [] heat/ice application    [x] other: REASSESS GOALS     Other Objective/Functional Measures: FOTO:  37   DF  -5    Pain Level (0-10 scale) post treatment: 0    ASSESSMENT/Changes in Function: Mr. Delvalle Settler  Reports  60-70%  Improvement. No pain on average. FOTO and  Ankle  DF Has improved. Patient will continue to benefit from skilled PT services to address functional mobility deficits, address ROM deficits, address strength deficits, analyze and address soft tissue restrictions, analyze and cue movement patterns and instruct in home and community integration to attain remaining goals. [x]  See Plan of Care  []  See progress note/recertification  []  See Discharge Summary         Progress towards goals / Updated goals:  Short Term Goals: To be accomplished in 5 treatments:  1. Demonstrate (I) with HEP  Eval Status: Initiated  Current Status: Met 6/2/17  2. Pt to tolerate 30 min or more of TE and/or Interventions w/o increased s/s  Eval Status: Initiated  Current Status: Progressing at 18 minutes 6/5/17  3.  Increase DF in (R) Foot to -15 or better for progress to normal ROM   Eval Status: Initiated  Current Status: Good progress at -8 today 6/9/17    -5  6/12/17  Following manual      Long Term Goals: To be accomplished in 18 treatments:  1. Pt will Amb on TM 1/4 mile w/o increased s/s  Eval Status: Initiated  Current Status:  2. Pt will have (R) foot AROM DF to 5* or better for good ankle mobility for a normal gait pattern  Eval Status: Initiated  Current Status: Progressing at -8 today 6/9/17  3. Pt will report 50% improvement with impairment since start of treatment  Eval Status: Initiated  Current Status:  60-70%  6/12/17  4. Pt will amb 1/2 mile on TM with little to no difficulty to show improvement in function and progress to PLOF  Eval Status: Initiated  Current Status:  5.  Pt will improve FOTO score to 61 in 16 visits to show significant improvement in function for progress to (I) community ambulation  Eval Status: 28  Current Status: Progressing at 32 6/5/17    PLAN  []  Upgrade activities as tolerated     [x]  Continue plan of care  []  Update interventions per flow sheet       []  Discharge due to:_  [x]  Other:_ FAX  MD NOTE     Zully Greer PTA 6/12/2017  11:18 AM    Future Appointments  Date Time Provider Jailyn Fernando   6/13/2017 8:20 AM Candy Garcia MD [facility] 69   6/14/2017 9:00 AM Ariela Mackey, PT [facility]   6/16/2017 9:00 AM Ariela Mackey, PT [facility]   6/19/2017 10:30 AM Maggie Negron PTA [facility]   6/21/2017 9:00 AM Jose Rasheed, PT [facility]   6/23/2017 9:00 AM Ariela Mackey PT [facility]   6/26/2017 9:00 AM Maggie Negron PTA [facility]   6/28/2017 9:00 AM Jose Rasheed, PT [facility]   6/30/2017 9:00 AM Ariela Mackey, PT [facility]

## 2017-06-12 NOTE — PROGRESS NOTES
[facility]  [address] [facility], [address] [address],[place]  [phone] fax    Progress Note  Patient name: Brenden Macedo Start of Care: 2017   Referral source: Lainey Ojeda MD : 1995    Medical Diagnosis: S/P Achilles tendon repair [Z98.890] Onset Date:17 followed by surgery 17    Treatment Diagnosis: Impaired gait and ankle mobility   Prior Hospitalization: see medical history Provider#: [id number]   Medications: Verified on Patient summary List   Comorbidities: None noted  Prior Level of Function: Able to play basketball and hang out with friends w/o (R) angle pain, Walk w/o crutches  Visits from Start of Care: 8    Missed Visits: 0    Established Goals:         Excellent           Good         Limited           None  [] Increased ROM   []  [x]  []  []  [] Increased Strength  []  [x]  []  []  [] Increased Mobility  []  [x]  []  []   [] Decreased Pain   []  []  []  []  [] Decreased Swelling  []  []  []  []    Key Functional Changes: Patient has shown good progress with this treatment program. Pain as of last visit was 0/10. Patient has shown decreased pain and increased strength and mobility. AROM (R) foot DF at -5* no added pain. Patient reports 60-70% improvement with overall involvement. FOTO score is 37. All STG/LTGs achieved as identified below. Fall Risk Assessment: Patient demonstrates a low Fall Risk     Progress towards goals / Updated goals:  Short Term Goals: To be accomplished in 5 treatments:  1. Demonstrate (I) with HEP  Eval Status: Initiated  Current Status: Met 17  2. Pt to tolerate 30 min or more of TE and/or Interventions w/o increased s/s  Eval Status: Initiated  Current Status: Progressing at 18 minutes 17  3.  Increase DF in (R) Foot to -15 or better for progress to normal ROM   Eval Status: Initiated  Current Status: Good progress at -8 today 17 -5 17 Following manual      Long Term Goals: To be accomplished in 18 treatments:  1. Pt will Amb on TM 1/4 mile w/o increased s/s  Eval Status: Initiated  Current Status: Not initiated  2. Pt will have (R) foot AROM DF to 5* or better for good ankle mobility for a normal gait pattern  Eval Status: Initiated  Current Status: Progressing at -5 today 6/12/17  3. Pt will report 50% improvement with impairment since start of treatment  Eval Status: Initiated  Current Status: 60-70% 6/12/17  4. Pt will amb 1/2 mile on TM with little to no difficulty to show improvement in function and progress to PLOF  Eval Status: Initiated  Current Status:  Not assessed  5. Pt will improve FOTO score to 61 in 16 visits to show significant improvement in function for progress to (I) community ambulation  Eval Status: 28  Current Status: Progressing at 37 6/12/17    Updated Goals: to be achieved in 10 more visits:  Continue with current goals  ASSESSMENT/RECOMMENDATIONS:  [x]Continue therapy per initial plan/protocol at a frequency of  2-3 x per week for 10 more visits  []Continue therapy with the following recommended changes:_____________________      _____________________________________________________________________  []Discontinue therapy progressing towards or have reached established goals  []Discontinue therapy due to lack of appreciable progress towards goals  []Discontinue therapy due to lack of attendance or compliance  []Await Physician's recommendations/decisions regarding therapy  []Other:________________________________________________________________    Thank you for this referral.   Jennifer Arteaga, PT 6/12/2017 6:35 PM  NOTE TO PHYSICIAN:  [address] AND   FAX TO [facility]: [phone]  If you are unable to process this request in 24 hours please contact our office: [phone]    []  I have read the above report and request that my patient continue as recommended.   []  I have read the above report and request that my patient continue therapy with the following changes/special instructions:________________________________________  []I have read the above report and request that my patient be discharged from therapy.     Physicians signature: ______________________________Date: _____Time:______

## 2017-06-13 ENCOUNTER — OFFICE VISIT (OUTPATIENT)
Dept: ORTHOPEDIC SURGERY | Age: 22
End: 2017-06-13

## 2017-06-13 VITALS
TEMPERATURE: 96.4 F | HEIGHT: 74 IN | DIASTOLIC BLOOD PRESSURE: 76 MMHG | SYSTOLIC BLOOD PRESSURE: 119 MMHG | HEART RATE: 80 BPM

## 2017-06-13 DIAGNOSIS — Z98.890 S/P ACHILLES TENDON REPAIR: Primary | ICD-10-CM

## 2017-06-13 DIAGNOSIS — S86.011D ACHILLES TENDON RUPTURE, RIGHT, SUBSEQUENT ENCOUNTER: ICD-10-CM

## 2017-06-13 NOTE — PROGRESS NOTES
Patient is here today for a follow up on his right achilles tendon repair.   He has a fullness by his [facility]

## 2017-06-13 NOTE — MR AVS SNAPSHOT
Visit Information Date & Time Provider Department Dept. Phone Encounter #  
 6/13/2017  8:20 AM Chiara Wongam, 27 [facility] [phone] [id number] Follow-up Instructions Return in about 2 weeks (around 6/27/2017). Upcoming Health Maintenance Date Due Hepatitis A Peds Age 1-18 (1 of 2 - Standard Series) 10/2/1998 DTaP/Tdap/Td series (1 - Tdap) 10/2/2004 HPV AGE 9Y-34Y (1 of 3 - Male 3 Dose Series) 10/2/2008 INFLUENZA AGE 9 TO ADULT 8/1/2017 Allergies as of 6/13/2017  Review Complete On: 6/13/2017 By: Richarda Curling No Known Allergies Current Immunizations  Never Reviewed No immunizations on file. Not reviewed this visit You Were Diagnosed With   
  
 Codes Comments S/P Achilles tendon repair    -  Primary ICD-10-CM: U45.000 ICD-9-CM: V45.89 Achilles tendon rupture, right, subsequent encounter     ICD-10-CM: S86.011D ICD-9-CM: V58.89, 845.09 Vitals BP Pulse Temp Height(growth percentile) Smoking Status 119/76 (26 %/ 36 %)* 80 96.4 °F (35.8 °C) (Oral) 6' 2\" (1.88 m) (94 %, Z= 1.57) Never Smoker *BP percentiles are based on NHBPEP's 4th Report Growth percentiles are based on CDC 2-20 Years data. Your Updated Medication List  
  
   
This list is accurate as of: 6/13/17  9:23 AM.  Always use your most recent med list.  
  
  
  
  
 aspirin 325 mg tablet Commonly known as:  ASPIRIN Take 1 Tab by mouth daily. **START TAKING AFTER SURGERY** HYDROcodone-acetaminophen 5-325 mg per tablet Commonly known as:  NORCO  
  
 oxyCODONE-acetaminophen 7.5-325 mg per tablet Commonly known as:  PERCOCET TAKE ONE TO TWO TABLETS BY MOUTH Q 4 - 6 HRS AS NEEDED FOR PAIN **AFTER SURGERY** DO NOT TAKE BEFORE SURGERY  Indications: Pain  
  
 polyethylene glycol 17 gram packet Commonly known as:  Meridee Rouge Take 1 Packet by mouth daily. promethazine 25 mg tablet Commonly known as:  PHENERGAN Take 1 Tab by mouth every six (6) hours as needed for Nausea. TYLENOL 325 mg tablet Generic drug:  acetaminophen Take  by mouth every four (4) hours as needed for Pain. Follow-up Instructions Return in about 2 weeks (around 6/27/2017). To-Do List   
 06/14/2017 9:00 AM  
  Appointment with González Newton PT at [facility] ([phone])  
  
 06/16/2017 9:00 AM  
  Appointment with González Newton PT at [address] [place] [address] ([phone])  
  
 06/19/2017 10:30 AM  
  Appointment with Abi Ratliff PTA at [facility] ([phone])  
  
 06/21/2017 9:00 AM  
  Appointment with Emma Arriaga PT at [facility] ([phone])  
  
 06/23/2017 9:00 AM  
  Appointment with González Newton PT at [address] [place] [address] ([phone])  
  
 06/26/2017 9:00 AM  
  Appointment with Abi Ratliff PTA at [facility] ([phone])  
  
 06/28/2017 9:00 AM  
  Appointment with Emma Arriaga PT at [facility] ([phone])  
  
 06/30/2017 9:00 AM  
  Appointment with González Newton PT at [facility] ([phone]) Patient Instructions You may remove one wedge PT gradual resuming to walk You may walk in the boot Follow up in two weeks Introducing [facility]! [facility] introduces [employer] patient portal. Now you can access parts of your medical record, email your doctor's office, and request medication refills online. 1. In your internet browser, go to [email or web address] 2. Click on the First Time User? Click Here link in the Sign In box. You will see the New Member Sign Up page. 3. Enter your [employer] Access Code exactly as it appears below. You will not need to use this code after youve completed the sign-up process. If you do not sign up before the expiration date, you must request a new code. · [employer] Access Code: [email or web address] Expires: 7/5/2017  2:01 PM 
 
4. Enter the last four digits of your Social Security Number (xxxx) and Date of Birth (mm/dd/yyyy) as indicated and click Submit. You will be taken to the next sign-up page. 5. Create a [employer] ID. This will be your [employer] login ID and cannot be changed, so think of one that is secure and easy to remember. 6. Create a [employer] password. You can change your password at any time. 7. Enter your Password Reset Question and Answer. This can be used at a later time if you forget your password. 8. Enter your e-mail address. You will receive e-mail notification when new information is available in [address] 9. Click Sign Up. You can now view and download portions of your medical record. 10. Click the Download Summary menu link to download a portable copy of your medical information. If you have questions, please visit the Frequently Asked Questions section of the [employer] website. Remember, [employer] is NOT to be used for urgent needs. For medical emergencies, dial 911. Now available from your iPhone and Android! Please provide this summary of care documentation to your next provider. Your primary care clinician is listed as Elvis Dyson. If you have any questions after today's visit, please call [phone].

## 2017-06-13 NOTE — PROGRESS NOTES
AMBULATORY PROGRESS NOTE      Patient: Park Brandt             MRN: [id number]     SSN: [id number] There is no height or weight on file to calculate BMI. YOB: 1995     AGE: 25 y.o. EX: male    PCP: Martha Hua, DO     IMPRESSION/DIAGNOSIS /// TREATMENT PLAN /// HPI AND EXAMINATION       DIAGNOSES  1. S/P Achilles tendon repair    2. Achilles tendon rupture, right, subsequent encounter        No orders of the defined types were placed in this encounter. Park Brandt understands his diagnoses and the proposed plan. 1.My plan is to have him continue physical therapy at this point in time. He can weightbear as tolerated. I took out two of the wedges, so he only has one small wedge in his CAM walker boot. He will continue physical therapy, weightbearing as tolerated, and two week followup. Sweetie Martinez IS A 25 y.o. male who presents to my outpatient office for evaluation of: The patient is here. He is nearly nine weeks and two days from his primary Achilles tendon repair, for which this was done on April 13, 2017, on this right side. He is accompanied by his mom today. He admits to having no pain. He is in physical therapy. He is in a CAM walker boot. He has three wedges in. The wedges were removed. On examination, the patient is alert and follows commands. He is in no acute distress. His affect and mood are appropriate. His incision looks very good. It is well-healed. There is no redness, erythema, drainage, and no signs of infection. On the plantar portion of his foot, I can feel a little bump or a little lump. It appears that he may have a possible plantar fibroma, but this is in a plantar medial portion of his foot. It is nontender. Otherwise, he has excellent range of motion of his toes. His calf is soft and nontender. There are no signs of DVT. He is nontender to the tibialis posterior tibial tendon. He has good inversion strength. I re-reviewed the MRI of his Achilles tendon that he had done on April 8, 2017, prior to his surgery. This was an MRI of the tibia and fibula, as well as the ankle, and in looking at the plantar medial side, I see no significant abnormality to the plantar fascia on the medial side. No significant abnormality to the FHL tendon also in this region. There was an atypical appearance of the distal posterior tibial tendon on the MRI of the ankle that was done on April 8, 2017, but clinically, he is nontender in this region. Again, my overall impression is Achilles tendon repair. Surgery was done on April 13, 2017. Recommendations are for formal physical therapy to be continued, and I will see him again in two weeks time to reassess him. Hopefully, we can get him out of the CAM walker boot around that time. CHART REVIEW     Past Medical History:   Diagnosis Date    Orthodontics braces     Current Outpatient Prescriptions   Medication Sig    HYDROcodone-acetaminophen (NORCO) 5-325 mg per tablet     oxyCODONE-acetaminophen (PERCOCET) 7.5-325 mg per tablet TAKE ONE TO TWO TABLETS BY MOUTH Q 4 - 6 HRS AS NEEDED FOR PAIN **AFTER SURGERY** DO NOT TAKE BEFORE SURGERY  Indications: Pain    aspirin (ASPIRIN) 325 mg tablet Take 1 Tab by mouth daily. **START TAKING AFTER SURGERY**    promethazine (PHENERGAN) 25 mg tablet Take 1 Tab by mouth every six (6) hours as needed for Nausea.  polyethylene glycol (MIRALAX) 17 gram packet Take 1 Packet by mouth daily.  acetaminophen (TYLENOL) 325 mg tablet Take  by mouth every four (4) hours as needed for Pain. No current facility-administered medications for this visit. No Known Allergies  Past Surgical History:   Procedure Laterality Date    HAND/FINGER SURGERY UNLISTED      HX ORTHOPAEDIC      finger    HX TONSIL AND ADENOIDECTOMY  2004     Social History     Occupational History    Not on file.      Social History Main Topics    Smoking status: Never Smoker    Smokeless tobacco: Not on file    Alcohol use Yes      Comment: ocassional     Drug use: Yes     Special: Marijuana      Comment: last use was 3/10/17    Sexual activity: Not on file     Family History   Problem Relation Age of Onset    Diabetes Maternal Grandmother         REVIEW OF SYSTEMS : 6/13/2017  ALL BELOW ARE Negative except : SEE HPI     Constitutional: Negative for fever, chills and weight loss. Neg Weight Loss  Cardiovascular: Negative for chest pain, claudication and leg swelling. SOB, CARRASCO   Gastrointestinal/Urological: Negative for pain, N/V/D/C, Blood in stool or urine,dysuria,  Hematuria, Incontinence  Musculoskeletal: see HPI. Neurological: Negative for dizziness and weakness, headaches,Visual Changes or   Confusion, or Seizures,   Psychiatric/Behavioral: Negative for depression, memory loss and substance abuse. Extremities:  Negative for hair changes, rash or skin lesion changes. Hematologic: Negative for Bleeding problems, bruising, pallor or swollen lymph nodes. Peripheral Vascular: No calf pain, vascular vein tenderness to calf pain              No calf throbbing, posterior knee throbbing pain     DIAGNOSTIC IMAGING     Please see above section of this report. I have personally reviewed the results of the above study. The interpretation of this study is my professional opinion.       Aureliano Palma MD  6/13/2017  9:36 AM

## 2017-06-13 NOTE — PATIENT INSTRUCTIONS
You may remove one wedge  PT gradual resuming to walk   You may walk in the boot  Follow up in two weeks

## 2017-06-14 ENCOUNTER — HOSPITAL ENCOUNTER (OUTPATIENT)
Dept: PHYSICAL THERAPY | Age: 22
Discharge: HOME | End: 2017-06-14
Payer: COMMERCIAL

## 2017-06-14 PROCEDURE — 97110 THERAPEUTIC EXERCISES: CPT

## 2017-06-14 PROCEDURE — 97035 APP MDLTY 1+ULTRASOUND EA 15: CPT

## 2017-06-14 PROCEDURE — 97140 MANUAL THERAPY 1/> REGIONS: CPT

## 2017-06-14 NOTE — PROGRESS NOTES
PT DAILY TREATMENT NOTE 3-16    Patient Name: Sea Felton  Date:2017  : 1995  [x]  Patient  Verified  Payor: BLUE CROSS / Plan: [employer] [facility] [place] / Product Type: PPO /    In time:919  Out time:100  Total Treatment Time (min): 59  Visit #: 9     Treatment Area: Pain in right ankle and joints of right foot [M25.571]    SUBJECTIVE  Pain Level (0-10 scale): 0  Any medication changes, allergies to medications, adverse drug reactions, diagnosis change, or new procedure performed?: [x] No    [] Yes (see summary sheet for update)  Subjective functional status/changes:   [] No changes reported  Went to the doctor and he said I can put some pressure on it.     OBJECTIVE  Modality rationale: decrease inflammation, decrease pain and increase tissue extensibility to improve the patients ability to perform increased ADL   Min Type Additional Details    [] Estim:  []Unatt       []IFC  []Premod                        []Other:  []w/ice   []w/heat  Position:  Location:    [] Estim: []Att    []TENS instruct  []NMES                    []Other:  []w/US   []w/ice   []w/heat  Position:  Location:    []  Traction: [] Cervical       []Lumbar                       [] Prone          []Supine                       []Intermittent   []Continuous Lbs:  [] before manual  [] after manual    [x]  Ultrasound: [x]Continuous   [] Pulsed        8 Min                   []1MHz   [x]3MHz Location: (R) plantar fascia and achilles tendon  W/cm2: 1.5    []  Iontophoresis with dexamethasone         Location: [] Take home patch   [] In clinic    []  Ice     []  heat  []  Ice massage  []  Laser   []  Anodyne Position:  Location:    []  Laser with stim  []  Other: Position:  Location:    []  Vasopneumatic Device Pressure:       [] lo [] med [] hi   Temperature: [] lo [] med [] hi   [] Skin assessment post-treatment:  []intact []redness- no adverse reaction    []redness  adverse reaction:     30 min Therapeutic Exercise:  [x] See flow sheet :   Rationale: increase ROM, increase strength and improve coordination to improve the patients ability to perform increased ADL  11 min Manual Therapy: Ankle mobs, STM/DTM plantar fascia,    Rationale: decrease pain, increase ROM, increase tissue extensibility and decrease trigger points to perform increased ADL           With   [x] TE   [] TA   [] neuro   [] other: Patient Education: [x] Review HEP    [] Progressed/Changed HEP based on:   [] positioning   [] body mechanics   [] transfers   [] heat/ice application    [] other:      Other Objective/Functional Measures:   - Add Approximation in sneaker\  - Add Sit -  CAM Boot   - Good tolerance with added exercises    Pain Level (0-10 scale) post treatment: 0    ASSESSMENT/Changes in Function:      Patient will continue to benefit from skilled PT services to modify and progress therapeutic interventions, address functional mobility deficits, address ROM deficits, address strength deficits, analyze and address soft tissue restrictions, analyze and cue movement patterns and analyze and modify body mechanics/ergonomics to attain remaining goals. []  See Plan of Care  []  See progress note/recertification  []  See Discharge Summary         Progress towards goals / Updated goals:  Short Term Goals: To be accomplished in 5 treatments:  1. Demonstrate (I) with HEP  Eval Status: Initiated  Current Status: Met 6/2/17  2. Pt to tolerate 30 min or more of TE and/or Interventions w/o increased s/s  Eval Status: Initiated  Current Status: Progressing at 18 minutes 6/5/17  3. Increase DF in (R) Foot to -15 or better for progress to normal ROM   Eval Status: Initiated  Current Status: Good progress at -8 today 6/9/17 -5 6/12/17 Following manual      Long Term Goals: To be accomplished in 18 treatments:  1. Pt will Amb on TM 1/4 mile w/o increased s/s  Eval Status: Initiated  Current Status: Pt amb 2 x 50' PWB (R) foot 6/14/17  2.  Pt will have (R) foot AROM DF to 5* or better for good ankle mobility for a normal gait pattern  Eval Status: Initiated  Current Status: Progressing at -5 today 6/12/17  3. Pt will report 50% improvement with impairment since start of treatment  Eval Status: Initiated  Current Status: 60-70% 6/12/17  4. Pt will amb 1/2 mile on TM with little to no difficulty to show improvement in function and progress to PLOF  Eval Status: Initiated  Current Status: Not assessed  5.  Pt will improve FOTO score to 61 in 16 visits to show significant improvement in function for progress to (I) community ambulation  Eval Status: 28  Current Status: Progressing at 37 6/12/17    PLAN  [x]  Upgrade activities as tolerated     [x]  Continue plan of care  []  Update interventions per flow sheet       []  Discharge due to:_  []  Other:_      Kriss Castanon, PT 6/14/2017  10:14 AM    Future Appointments  Date Time Provider Jailyn Fernando   6/16/2017 9:00 AM Kriss Castanon, PT [facility]   6/19/2017 10:30 AM Maggie Negron, PTA [facility]   6/21/2017 9:00 AM Zac Schwarz, [facility]   6/23/2017 9:00 AM Kriss Castanon, PT [facility]   6/26/2017 9:00 AM Maggie Negron PTA [facility]   6/27/2017 3:20 PM Sharron Norman MD [place] [id number]   6/28/2017 9:00 AM Zac Schwarz, PT [facility]   6/30/2017 9:00 AM Kriss Castanon, PT [facility]

## 2017-06-16 ENCOUNTER — HOSPITAL ENCOUNTER (OUTPATIENT)
Dept: PHYSICAL THERAPY | Age: 22
Discharge: HOME | End: 2017-06-16
Payer: COMMERCIAL

## 2017-06-16 PROCEDURE — 97035 APP MDLTY 1+ULTRASOUND EA 15: CPT

## 2017-06-16 PROCEDURE — 97110 THERAPEUTIC EXERCISES: CPT

## 2017-06-16 NOTE — PROGRESS NOTES
PT DAILY TREATMENT NOTE 3-16    Patient Name: Ian Nieto  Date:2017  : 1995  [x]  Patient  Verified  Payor: BLUE CROSS / Plan: [employer] [facility] [place] / Product Type: PPO /    In time:911  Out time:1008  Total Treatment Time (min): 42  Visit #: 10 of     Treatment Area: Pain in right ankle and joints of right foot [M25.571]    SUBJECTIVE  Pain Level (0-10 scale): 0  Any medication changes, allergies to medications, adverse drug reactions, diagnosis change, or new procedure performed?: [x] No    [] Yes (see summary sheet for update)  Subjective functional status/changes:   [] No changes reported  Went to the doctor and he said I can put some pressure on it.     OBJECTIVE  Modality rationale: decrease inflammation, decrease pain and increase tissue extensibility to improve the patients ability to perform increased ADL   Min Type Additional Details    [] Estim:  []Unatt       []IFC  []Premod                        []Other:  []w/ice   []w/heat  Position:  Location:    [] Estim: []Att    []TENS instruct  []NMES                    []Other:  []w/US   []w/ice   []w/heat  Position:  Location:    []  Traction: [] Cervical       []Lumbar                       [] Prone          []Supine                       []Intermittent   []Continuous Lbs:  [] before manual  [] after manual    [x]  Ultrasound: [x]Continuous   [] Pulsed        8 Min                   []1MHz   [x]3MHz Location: (R) plantar fascia and achilles tendon  W/cm2: 1.5    []  Iontophoresis with dexamethasone         Location: [] Take home patch   [] In clinic    []  Ice     []  heat  []  Ice massage  []  Laser   []  Anodyne Position:  Location:    []  Laser with stim  []  Other: Position:  Location:    []  Vasopneumatic Device Pressure:       [] lo [] med [] hi   Temperature: [] lo [] med [] hi   [x] Skin assessment post-treatment:  [x]intact []redness- no adverse reaction    []redness  adverse reaction:     34 min Therapeutic Exercise:  [x] See flow sheet :   Rationale: increase ROM, increase strength and improve coordination to improve the patients ability to perform increased ADL         With   [x] TE   [] TA   [] neuro   [] other: Patient Education: [x] Review HEP    [] Progressed/Changed HEP based on:   [] positioning   [] body mechanics   [] transfers   [] heat/ice application    [] other:      Other Objective/Functional Measures:   - Add Approximation in sneaker\  - Add Sit -  shoes  - Pt amb with cruch with a (R) 3 point gait pattern   - Good tolerance with added exercises    Pain Level (0-10 scale) post treatment: 0    ASSESSMENT/Changes in Function:      Patient will continue to benefit from skilled PT services to modify and progress therapeutic interventions, address functional mobility deficits, address ROM deficits, address strength deficits, analyze and address soft tissue restrictions, analyze and cue movement patterns and analyze and modify body mechanics/ergonomics to attain remaining goals. []  See Plan of Care  []  See progress note/recertification  []  See Discharge Summary         Progress towards goals / Updated goals:  Short Term Goals: To be accomplished in 5 treatments:  1. Demonstrate (I) with HEP  Eval Status: Initiated  Current Status: Met 6/2/17  2. Pt to tolerate 30 min or more of TE and/or Interventions w/o increased s/s  Eval Status: Initiated  Current Status: Progressing at 18 minutes 6/5/17  3. Increase DF in (R) Foot to -15 or better for progress to normal ROM   Eval Status: Initiated  Current Status: Good progress at -8 today 6/9/17 -5 6/12/17 Following manual      Long Term Goals: To be accomplished in 18 treatments:  1. Pt will Amb on TM 1/4 mile w/o increased s/s  Eval Status: Initiated  Current Status: Pt amb 200' PWB (R) foot 6/16/17  2.  Pt will have (R) foot AROM DF to 5* or better for good ankle mobility for a normal gait pattern  Eval Status: Initiated  Current Status: Progressing at -5 today 6/12/17  3. Pt will report 50% improvement with impairment since start of treatment  Eval Status: Initiated  Current Status: 60-70% 6/12/17  4. Pt will amb 1/2 mile on TM with little to no difficulty to show improvement in function and progress to PLOF  Eval Status: Initiated  Current Status: Not assessed  5.  Pt will improve FOTO score to 61 in 16 visits to show significant improvement in function for progress to (I) community ambulation  Eval Status: 28  Current Status: Progressing at 37 6/12/17    PLAN  [x]  Upgrade activities as tolerated     [x]  Continue plan of care  []  Update interventions per flow sheet       []  Discharge due to:_  []  Other:_      Claudette Bower, PT 6/16/2017  7:01 PM    Future Appointments  Date Time Provider Jailyn Fernando   6/19/2017 10:30 AM Maggie Negron PTA [facility]   6/21/2017 9:00 AM Christina Foley PT [facility]   6/23/2017 9:00 AM Claudette Bower, PT [facility]   6/26/2017 9:00 AM Maggie Oconnor PTA [facility]   6/27/2017 3:20 PM Devyn Palacios MD [place] [id number]   6/28/2017 9:00 AM Christina Foley PT [facility]   6/30/2017 9:00 AM Claudette Bower PT [facility]

## 2017-06-19 ENCOUNTER — HOSPITAL ENCOUNTER (OUTPATIENT)
Dept: PHYSICAL THERAPY | Age: 22
Discharge: HOME | End: 2017-06-19
Payer: COMMERCIAL

## 2017-06-19 PROCEDURE — 97035 APP MDLTY 1+ULTRASOUND EA 15: CPT

## 2017-06-19 PROCEDURE — 97110 THERAPEUTIC EXERCISES: CPT

## 2017-06-19 NOTE — PROGRESS NOTES
PT DAILY TREATMENT NOTE - [facility]     Patient Name: Tammy Muro  Date:2017  : 1995  [x]  Patient  Verified  Payor: BLUE CROSS / Plan: [employer] [facility] [place] / Product Type: PPO /    In time:10:23  Out time:11:01  Total Treatment Time (min): 34  Visit #: 11 of 12    Treatment Area: Pain in right ankle and joints of right foot [M25.571]    SUBJECTIVE  Pain Level (0-10 scale): 0  Any medication changes, allergies to medications, adverse drug reactions, diagnosis change, or new procedure performed?: [x] No    [] Yes (see summary sheet for update)  Subjective functional status/changes:   [] No changes reported  No   Pain.     OBJECTIVE    Modality rationale: decrease edema, decrease inflammation, decrease pain and increase tissue extensibility to improve the patients ability to perform ADL    Min Type Additional Details    [] Estim:  []Unatt       []IFC  []Premod                        []Other:  []w/ice   []w/heat  Position:  Location:    [] Estim: []Att    []TENS instruct  []NMES                    []Other:  []w/US   []w/ice   []w/heat  Position:  Location:    []  Traction: [] Cervical       []Lumbar                       [] Prone          []Supine                       []Intermittent   []Continuous Lbs:  [] before manual  [] after manual    [x]  Ultrasound: [x]Continuous   [] Pulsed             8              [x]1MHz   []3MHz W/cm2:1.3  Location:(R)  achilles    []  Iontophoresis with dexamethasone         Location: [] Take home patch   [] In clinic    []  Ice     []  heat  []  Ice massage  []  Laser   []  Anodyne Position:  Location:    []  Laser with stim  []  Other:  Position:  Location:    []  Vasopneumatic Device Pressure:       [] lo [] med [] hi   Temperature: [] lo [] med [] hi   [x] Skin assessment post-treatment:  [x]intact []redness- no adverse reaction    []redness  adverse reaction:      min []Eval                  []Re-Eval       26 min Therapeutic Exercise:  [] See flow sheet : Rationale: increase ROM and increase strength to improve the patients ability to perform ADL      min Therapeutic Activity:  []  See flow sheet :   Rationale: increase ROM and increase strength  to improve the patients ability to perform ADL       min Neuromuscular Re-education:  []  See flow sheet :   Rationale:   to improve the patients ability to      min Manual Therapy:     Rationale: decrease pain, increase ROM, increase tissue extensibility and decrease edema  to perform ADL      min Gait Training:  ___ feet with ___ device on level surfaces with ___ level of assist   Rationale: With   [x] TE   [] TA   [] neuro   [] other: Patient Education: [x] Review HEP    [] Progressed/Changed HEP based on:   [] positioning   [] body mechanics   [] transfers   [] heat/ice application    [] other:      Other Objective/Functional Measures:  Ambulated  300ft with  No difficulty. Pain Level (0-10 scale) post treatment:0    ASSESSMENT/Changes in Function: Completed  Each there ex  Fairly  Well. Patient will continue to benefit from skilled PT services to address functional mobility deficits, address ROM deficits, address strength deficits, analyze and address soft tissue restrictions and instruct in home and community integration to attain remaining goals. [x]  See Plan of Care  []  See progress note/recertification  []  See Discharge Summary         Progress towards goals / Updated goals:  Short Term Goals: To be accomplished in 5 treatments:  1. Demonstrate (I) with HEP  Eval Status: Initiated  Current Status: Met 6/2/17  2. Pt to tolerate 30 min or more of TE and/or Interventions w/o increased s/s  Eval Status: Initiated  Current Status: Progressing at 18 minutes 6/5/17  3. Increase DF in (R) Foot to -15 or better for progress to normal ROM   Eval Status: Initiated  Current Status: Good progress at -8 today 6/9/17 -5 6/12/17 Following manual      Long Term Goals:  To be accomplished in 18 treatments:  1. Pt will Amb on TM 1/4 mile w/o increased s/s  Eval Status: Initiated  Current Status: Pt amb 200' PWB (R) foot 6/16/17  2. Pt will have (R) foot AROM DF to 5* or better for good ankle mobility for a normal gait pattern  Eval Status: Initiated  Current Status: Progressing at -5 today 6/12/17  3. Pt will report 50% improvement with impairment since start of treatment  Eval Status: Initiated  Current Status: 60-70% 6/12/17  4. Pt will amb 1/2 mile on TM with little to no difficulty to show improvement in function and progress to PLOF  Eval Status: Initiated  Current Status: Not assessed  5.  Pt will improve FOTO score to 61 in 16 visits to show significant improvement in function for progress to (I) community ambulation  Eval Status: 28  Current Status: Progressing at 37 6/12/17    PLAN  []  Upgrade activities as tolerated     [x]  Continue plan of care  []  Update interventions per flow sheet       []  Discharge due to:_  []  Other:_      Maggie Negron PTA 6/19/2017  10:10 AM    Future Appointments  Date Time Provider Jailyn Fernando   6/19/2017 10:30 AM Maggie Negron PTA [facility]   6/21/2017 9:00 AM Jinny Johnson, PT [facility]   6/23/2017 9:00 AM Andrea Dowd, PT [facility]   6/26/2017 9:00 AM Joie Greenwood PTA [facility]   6/27/2017 3:20 PM MD Mirella Cuellar [address]   6/28/2017 9:00 AM Jinny Johnson, PT [facility]   6/30/2017 9:00 AM Andrea Dowd, PT [facility]

## 2017-06-21 ENCOUNTER — HOSPITAL ENCOUNTER (OUTPATIENT)
Dept: PHYSICAL THERAPY | Age: 22
Discharge: HOME | End: 2017-06-21
Payer: COMMERCIAL

## 2017-06-21 PROCEDURE — 97110 THERAPEUTIC EXERCISES: CPT

## 2017-06-21 PROCEDURE — 97035 APP MDLTY 1+ULTRASOUND EA 15: CPT

## 2017-06-21 NOTE — PROGRESS NOTES
PT DAILY TREATMENT NOTE     Patient Name: Bernadine Salazar  Date:2017  : 1995  [x]  Patient  Verified  Payor: BLUE CROSS / Plan: [employer] [facility] / Product Type: PPO /    In time:908  Out time:951  Total Treatment Time (min): 34  Visit #: 12 of     Treatment Area: Pain in right ankle and joints of right foot [M25.571]    SUBJECTIVE  Pain Level (0-10 scale): 0  Any medication changes, allergies to medications, adverse drug reactions, diagnosis change, or new procedure performed?: [x] No    [] Yes (see summary sheet for update)  Subjective functional status/changes:   [] No changes reported   I am going to stay with the B CR until I see the doctor    OBJECTIVE    Modality rationale: increase tissue extensibility to improve the patients ability to aid with increase tolerance to ADLS and activities   Min Type Additional Details    [] Estim:  []Unatt       []IFC  []Premod                        []Other:  []w/ice   []w/heat  Position:  Location:    [] Estim: []Att    []TENS instruct  []NMES                    []Other:  []w/US   []w/ice   []w/heat  Position:  Location:    []  Traction: [] Cervical       []Lumbar                       [] Prone          []Supine                       []Intermittent   []Continuous Lbs:  [] before manual  [] after manual   8 [x]  Ultrasound: [x]Continuous   [] Pulsed                           [x]1MHz   []3MHz W/cm2:1.3  Location:achilles distal tendon and arch    []  Iontophoresis with dexamethasone         Location: [] Take home patch   [] In clinic    []  Ice     []  heat  []  Ice massage  []  Laser   []  Anodyne Position:  Location:    []  Laser with stim  []  Other:  Position:  Location:    []  Vasopneumatic Device Pressure:       [] lo [] med [] hi   Temperature: [] lo [] med [] hi   [] Skin assessment post-treatment:  []intact []redness- no adverse reaction    []redness  adverse reaction:       min []Eval                  []Re-Eval       26 min Therapeutic Exercise:  [x] See flow sheet :   Rationale: increase ROM, increase strength and improve coordination to improve the patients ability to aid with increase tolerance to ADLs and activities     min Therapeutic Activity:  []  See flow sheet :   Rationale:   to improve the patients ability to       min Neuromuscular Re-education:  []  See flow sheet :   Rationale:   to improve the patients ability to      min Manual Therapy:     Rationale:  to      min Gait Training:  __400_ feet with ___B CR  device on level surfaces with ___S  level of assist   Rationale: With   [] TE   [] TA   [] neuro   [] other: Patient Education: [x] Review HEP    [] Progressed/Changed HEP based on:   [] positioning   [] body mechanics   [] transfers   [] heat/ice application    [] other:      Other Objective/Functional Measures: VC exercises and technique     Pain Level (0-10 scale) post treatment:0    ASSESSMENT/Changes in Function: increased distance with ambulation    Patient will continue to benefit from skilled PT services to modify and progress therapeutic interventions, address functional mobility deficits, address ROM deficits, address strength deficits, analyze and address soft tissue restrictions, analyze and cue movement patterns, analyze and modify body mechanics/ergonomics, assess and modify postural abnormalities and instruct in home and community integration to attain remaining goals. [x]  See Plan of Care  [x]  See progress note/recertification  []  See Discharge Summary         Progress towards goals / Updated goals:   Short Term Goals: To be accomplished in 5 treatments:  1. Demonstrate (I) with HEP  Eval Status: Initiated  Current Status: Met 6/2/17 6/21/17  2. Pt to tolerate 30 min or more of TE and/or Interventions w/o increased s/s  Eval Status: Initiated  Current Status: Progressing at 18 minutes 6/5/17   26 minutes 6/21/17  3.  Increase DF in (R) Foot to -15 or better for progress to normal ROM   Eval Status: Initiated  Current Status: Good progress at -8 today 6/9/17 -5 6/12/17 Following manual      Long Term Goals: To be accomplished in 18 treatments:  1. Pt will Amb on TM 1/4 mile w/o increased s/s  Eval Status: Initiated  Current Status: Pt amb 400' WBAT B CR and regular shoes 6/21/17  2. Pt will have (R) foot AROM DF to 5* or better for good ankle mobility for a normal gait pattern  Eval Status: Initiated  Current Status: Progressing at -5 today 6/12/17  3. Pt will report 50% improvement with impairment since start of treatment  Eval Status: Initiated  Current Status: 60-70% 6/12/17  4. Pt will amb 1/2 mile on TM with little to no difficulty to show improvement in function and progress to PLOF  Eval Status: Initiated  Current Status: Pt. amb 400' WBAT B CR and regular shoes 6/21/17  5.  Pt will improve FOTO score to 61 in 16 visits to show significant improvement in function for progress to (I) community ambulation  Eval Status: 28  Current Status: Progressing at 37 6/12/17    PLAN  [x]  Upgrade activities as tolerated     [x]  Continue plan of care  []  Update interventions per flow sheet       []  Discharge due to:_  []  Other:_      Lon Jacobs PT 6/21/2017  9:27 AM    Future Appointments  Date Time Provider Jailyn Fernando   6/23/2017 9:00 AM Homero Wu PT [facility]   6/26/2017 9:00 AM Maggie Oconnor PTA [facility]   6/27/2017 3:20 PM MD Mire[place] [address]   6/28/2017 9:00 AM Lon Jacobs PT [facility]   6/30/2017 9:00 AM Homero Wu PT [facility]

## 2017-06-23 ENCOUNTER — HOSPITAL ENCOUNTER (OUTPATIENT)
Dept: PHYSICAL THERAPY | Age: 22
Discharge: HOME | End: 2017-06-23
Payer: COMMERCIAL

## 2017-06-23 PROCEDURE — 97140 MANUAL THERAPY 1/> REGIONS: CPT

## 2017-06-23 PROCEDURE — 97110 THERAPEUTIC EXERCISES: CPT

## 2017-06-23 NOTE — PROGRESS NOTES
PT DAILY TREATMENT NOTE 3-    Patient Name: Brenden Macedo  Date:2017  : 1995  [x]  Patient  Verified  Payor: Shira Stevens / Plan: [facility] [place] / Product Type: PPO /    In time:907  Out time:958  Total Treatment Time (min): 52  Visit #: 13 of     Treatment Area: Pain in right ankle and joints of right foot [M25.571]    SUBJECTIVE  Pain Level (0-10 scale): 0  Any medication changes, allergies to medications, adverse drug reactions, diagnosis change, or new procedure performed?: [x] No    [] Yes (see summary sheet for update)  Subjective functional status/changes:   [] No changes reported  Doing fine    OBJECTIVE  44 min Therapeutic Exercise:  [x] See flow sheet :   Rationale: increase ROM, increase strength and improve coordination to improve the patients ability to tolerate increased activity levels  8 min Manual Therapy:  Calf Str, Ankle mobs, mortis mobs   Rationale: increase ROM and increase tissue extensibility to perform increased ADL             With   [x] TE   [] TA   [] neuro   [] other: Patient Education: [x] Review HEP    [] Progressed/Changed HEP based on:   [] positioning   [] body mechanics   [] transfers   [] heat/ice application    [] other:      Other Objective/Functional Measures:   - DF -6*       Pain Level (0-10 scale) post treatment: 0    ASSESSMENT/Changes in Function:      Patient will continue to benefit from skilled PT services to modify and progress therapeutic interventions, address functional mobility deficits, address ROM deficits, address strength deficits, analyze and address soft tissue restrictions, analyze and cue movement patterns and analyze and modify body mechanics/ergonomics to attain remaining goals. []  See Plan of Care  []  See progress note/recertification  []  See Discharge Summary         Progress towards goals / Updated goals:   Short Term Goals: To be accomplished in 5 treatments:  1.  Demonstrate (I) with HEP  Eval Status: Initiated  Current Status: Met 6/2/17 6/21/17  2. Pt to tolerate 30 min or more of TE and/or Interventions w/o increased s/s  Eval Status: Initiated  Current Status: Progressing at 18 minutes 6/5/17   26 minutes 6/21/17  3. Increase DF in (R) Foot to -15 or better for progress to normal ROM   Eval Status: Initiated  Current Status: Good progress at -8 today 6/9/17 -5 6/12/17 Following manual      Long Term Goals: To be accomplished in 18 treatments:  1. Pt will Amb on TM 1/4 mile w/o increased s/s  Eval Status: Initiated  Current Status: Pt amb 400' WBAT B CR and regular shoes 6/21/17  2. Pt will have (R) foot AROM DF to 5* or better for good ankle mobility for a normal gait pattern  Eval Status: Initiated  Current Status: Progressing at -6 today 6/23/17  3. Pt will report 50% improvement with impairment since start of treatment  Eval Status: Initiated  Current Status: 60-70% 6/12/17  4. Pt will amb 1/2 mile on TM with little to no difficulty to show improvement in function and progress to PLOF  Eval Status: Initiated  Current Status: Pt. amb 400' WBAT B CR and regular shoes 6/21/17  5.  Pt will improve FOTO score to 61 in 16 visits to show significant improvement in function for progress to (I) community ambulation  Eval Status: 28  Current Status: Progressing at 37 6/12/17  PLAN  []  Upgrade activities as tolerated     []  Continue plan of care  []  Update interventions per flow sheet       []  Discharge due to:_  []  Other:_      Andrea Dowd, PT 6/23/2017  9:51 AM    Future Appointments  Date Time Provider Jailyn Kassie   6/26/2017 9:00 AM JOCELYN Garcia [facility]   6/27/2017 3:20 PM MD Mirella Cuellar   6/28/2017 9:00 AM Jinny Johnson, GABRIELA [facility]   6/30/2017 9:00 AM Andrea Dowd PT [facility]

## 2017-06-26 ENCOUNTER — HOSPITAL ENCOUNTER (OUTPATIENT)
Dept: PHYSICAL THERAPY | Age: 22
Discharge: HOME | End: 2017-06-26
Payer: COMMERCIAL

## 2017-06-26 PROCEDURE — 97110 THERAPEUTIC EXERCISES: CPT

## 2017-06-26 PROCEDURE — 97140 MANUAL THERAPY 1/> REGIONS: CPT

## 2017-06-26 NOTE — PROGRESS NOTES
PT DAILY TREATMENT NOTE - [facility]     Patient Name: Beverly Allen  Date:2017  : 1995  [x]  Patient  Verified  Payor: BLUE CROSS / Plan: [employer] [facility] / Product Type: PPO /    In time:9:04  Out time: 10:00  Total Treatment Time (min): 56  Visit #: 14 of 18    Treatment Area: Pain in right ankle and joints of right foot [M25.571]    SUBJECTIVE  Pain Level (0-10 scale): 0  Any medication changes, allergies to medications, adverse drug reactions, diagnosis change, or new procedure performed?: [x] No    [] Yes (see summary sheet for update)  Subjective functional status/changes:   [] No changes reported  No pain.     OBJECTIVE    Modality rationale: decrease edema, decrease inflammation, decrease pain and increase tissue extensibility to improve the patients ability to perform ADL    Min Type Additional Details    [] Estim:  []Unatt       []IFC  []Premod                        []Other:  []w/ice   []w/heat  Position:  Location:    [] Estim: []Att    []TENS instruct  []NMES                    []Other:  []w/US   []w/ice   []w/heat  Position:  Location:    []  Traction: [] Cervical       []Lumbar                       [] Prone          []Supine                       []Intermittent   []Continuous Lbs:  [] before manual  [] after manual    []  Ultrasound: []Continuous   [] Pulsed                           []1MHz   []3MHz W/cm2:  Location:    []  Iontophoresis with dexamethasone         Location: [] Take home patch   [] In clinic   10 [x]  Ice  post   []  heat  []  Ice massage  []  Laser   []  Anodyne Position:long sit  Location:(R)  ankle    []  Laser with stim  []  Other:  Position:  Location:    []  Vasopneumatic Device Pressure:       [] lo [] med [] hi   Temperature: [] lo [] med [] hi   [x] Skin assessment post-treatment:  [x]intact []redness- no adverse reaction    []redness  adverse reaction:      min []Eval                  []Re-Eval       30 min Therapeutic Exercise:  [x] See flow sheet : Rationale: increase ROM and increase strength to improve the patients ability to perform ADL     8 min Therapeutic Activity:  [x]  See flow sheet :   Rationale: increase ROM and increase strength  to improve the patients ability to perform ADL 8      min Neuromuscular Re-education:  []  See flow sheet :   Rationale:   to improve the patients ability to     8 min Manual Therapy:  Effleruage/Ankle  JT mob   Rationale: decrease pain, increase ROM, increase tissue extensibility and decrease edema  to perform ADL      min Gait Training:  ___ feet with ___ device on level surfaces with ___ level of assist   Rationale: With   [x] TE   [] TA   [] neuro   [] other: Patient Education: [x] Review HEP    [] Progressed/Changed HEP based on:   [] positioning   [] body mechanics   [] transfers   [] heat/ice application    [] other:      Other Objective/Functional Measures:  Responded  Fairly  Well  To each there ex    Pain Level (0-10 scale) post treatment: 0    ASSESSMENT/Changes in Function: Minimal  Edema  Today. Minimal  Tightness  (R)  Ankle. Patient will continue to benefit from skilled PT services to address functional mobility deficits, address ROM deficits, address strength deficits, analyze and address soft tissue restrictions, analyze and cue movement patterns and instruct in home and community integration to attain remaining goals. [x]  See Plan of Care  []  See progress note/recertification  []  See Discharge Summary         Progress towards goals / Updated goals:   Short Term Goals: To be accomplished in 5 treatments:  1. Demonstrate (I) with HEP  Eval Status: Initiated  Current Status: Met 6/2/17 6/21/17  2. Pt to tolerate 30 min or more of TE and/or Interventions w/o increased s/s  Eval Status: Initiated  Current Status: Progressing at 18 minutes 6/5/17   26 minutes 6/21/17  3.  Increase DF in (R) Foot to -15 or better for progress to normal ROM   Eval Status: Initiated  Current Status: Good progress at -8 today 6/9/17 -5 6/12/17 Following manual      Long Term Goals: To be accomplished in 18 treatments:  1. Pt will Amb on TM 1/4 mile w/o increased s/s  Eval Status: Initiated  Current Status: Pt amb 400' WBAT B CR and regular shoes 6/21/17  2. Pt will have (R) foot AROM DF to 5* or better for good ankle mobility for a normal gait pattern  Eval Status: Initiated  Current Status: Progressing at -6 today 6/23/17  3. Pt will report 50% improvement with impairment since start of treatment  Eval Status: Initiated  Current Status: 60-70% 6/12/17  4. Pt will amb 1/2 mile on TM with little to no difficulty to show improvement in function and progress to PLOF  Eval Status: Initiated  Current Status: Pt. amb 400' WBAT B CR and regular shoes 6/21/17  5.  Pt will improve FOTO score to 61 in 16 visits to show significant improvement in function for progress to (I) community ambulation  Eval Status: 28  Current Status: Progressing at 37 6/12/17    PLAN  []  Upgrade activities as tolerated     [x]  Continue plan of care  []  Update interventions per flow sheet       []  Discharge due to:_  []  Other:_      Maggie Negron PTA 6/26/2017  9:37 AM    Future Appointments  Date Time Provider Jailyn Fernando   6/27/2017 3:20 PM MD Mirella Escudero 71   6/28/2017 9:00 AM Lorenza Lizama PTA [facility]   6/30/2017 9:00 AM [address] [place] [facility]

## 2017-06-27 ENCOUNTER — OFFICE VISIT (OUTPATIENT)
Dept: ORTHOPEDIC SURGERY | Age: 22
End: 2017-06-27

## 2017-06-27 VITALS
HEIGHT: 74 IN | SYSTOLIC BLOOD PRESSURE: 128 MMHG | DIASTOLIC BLOOD PRESSURE: 72 MMHG | TEMPERATURE: 97.9 F | HEART RATE: 83 BPM | WEIGHT: 176 LBS | BODY MASS INDEX: 22.59 KG/M2

## 2017-06-27 DIAGNOSIS — S86.011D ACHILLES TENDON RUPTURE, RIGHT, SUBSEQUENT ENCOUNTER: ICD-10-CM

## 2017-06-27 DIAGNOSIS — Z98.890 S/P ACHILLES TENDON REPAIR: Primary | ICD-10-CM

## 2017-06-27 NOTE — LETTER
NOTIFICATION RETURN TO WORK / SCHOOL 
 
6/27/2017 4:06 PM 
 
Mr. Gaby Haney [place] [id number] [place] [id number] To Whom It May Concern: Gaby Haney is currently under the care of [facility]. Mr. Johnna Swift is currently being treated for his achilles tendon repair. Please excuse him from work for an additional 4-5 weeks. He will be reevaluation in 4 weeks. If there are questions or concerns please have the patient contact our office.  
 
 
 
Sincerely, 
 
 
Silva Jeffrey MD

## 2017-06-27 NOTE — PATIENT INSTRUCTIONS
The patient is instructed to follow up 4 weeks. They were advised to contact us if their condition worsens. Achilles Tendon Tear: Care Instructions  Your Care Instructions    You have ruptured or torn your Achilles tendon. The Achilles tendon (also called the heel cord) connects the calf muscles on the back of the lower leg to the bone at the base of the heel. Treatment for an Achilles tendon injury depends on whether the tendon has been partially torn or completely ruptured. A cast or splint can often treat a partial tear. If your tendon has ruptured, you may need surgery. You and your orthopedic doctor will choose a treatment plan, so it is important to go to any follow-up appointments. Follow-up care is a key part of your treatment and safety. Be sure to make and go to all appointments, and call your doctor if you are having problems. Its also a good idea to know your test results and keep a list of the medicines you take. How can you care for yourself at home? · Prop up the sore foot on a pillow anytime you sit or lie down during the next 3 days. Try to keep it above the level of your heart. This will help reduce swelling. · Take pain medicines exactly as directed. ¨ If the doctor gave you a prescription medicine for pain, take it as prescribed. ¨ If you are not taking a prescription pain medicine, ask your doctor if you can take an over-the-counter medicine. · Do not put weight on the affected foot until your doctor says you can. Use crutches or a walker. · Wear the splint or cast as directed until your doctor says you can remove it. When should you call for help? Call 911 anytime you think you may need emergency care. For example, call if:  · You have sudden chest pain and shortness of breath, or you cough up blood. Call your doctor now or seek immediate medical care if:  · You have increased or severe pain. · Your foot is cool or pale or changes color.   · You have tingling, weakness, or numbness in your toes. · Your cast or splint feels too tight. · You cannot move your toes. · You have a fever, or there is drainage or a bad smell coming from the cast.  · You have signs of a blood clot, such as:  ¨ Pain in your calf, back of the knee, thigh, or groin. ¨ Redness or swelling in your leg. · The skin under your cast or splint burns or stings. Watch closely for changes in your health, and be sure to contact your doctor if:  · You do not get better as expected. Where can you learn more? Go to http://linh-nuha.info/. Enter F495 in the search box to learn more about \"Achilles Tendon Tear: Care Instructions. \"  Current as of: May 23, 2016  Content Version: 11.3  © 2997-4841 [employer]. Care instructions adapted under license by [employer] (which disclaims liability or warranty for this information). If you have questions about a medical condition or this instruction, always ask your healthcare professional. [place] [address] any warranty or liability for your use of this information.

## 2017-06-27 NOTE — PROGRESS NOTES
AMBULATORY PROGRESS NOTE      Patient: Anjelica Demarco             MRN: [id number]     SSN: [id number] Body mass index is 22.6 kg/(m^2). YOB: 1995     AGE: 25 y.o. EX: male    PCP: Anthony Vazquez DO    IMPRESSION/DIAGNOSIS AND TREATMENT PLAN     DIAGNOSES  1. S/P Achilles tendon repair    2. Achilles tendon rupture, right, subsequent encounter        Orders Placed This Encounter    REFERRAL TO [address] [place] [address],[address] understands his diagnoses and the proposed plan. Plan:    1) Work note to remain out of work for addition 4-6 weeks  2) Formal PT WB as tolerated. No single stance raises    RTO - 4 weeks    He states the therapist has him walking without his boot, walking in a tennis shoe with crutches. He admits to having no pain at all to his right hindfoot. He had surgery. He is two days shy of 11 weeks, approximately, having had surgery in mid-April of this year. He admits to having no pain or discomfort to this hindfoot. His plans are to enter the [employer]. He is receiving therapy at [facility]. The plan is to continue physical therapy, continue advancing weightbearing as tolerated. No single-stance rising and no running. Otherwise, he is doing quite well. The incision looks fantastic. There are no signs of any infection and no signs of any dehiscence. HPI AND EXAMINATION     Hui Martinez IS A 25 y.o. male who presents to my outpatient office for follow up of 10 weeks 5 days s/p achilles tendon repair. At last visit, patient was paced in right tall CAM boot, continue HEP with gentle ROM, and referred to PT for active and passive ROM. Patient presents to the office ambulating with crutches and with his tall CAM boot. He rates his pain 0/10 today.      Anjelica Demarco arrives to office via: with assistive device; accompanied by mother  Patient is a well developed, well nourished 25 y.o. male alert and oriented x 3 in no acute distress.      RIGHT ANKLE:  APPEARANCE: alert, well appearing, and in no distress. PSYCH: Normal affect, mood, and conduct. alert, oriented x 3  no dementia  GEN: Well developed, well nourished 25 y.o. male in no acute distress. SURGICAL DRESSINGS: No soilage present  TENDERNESS:   No incisional tenderness   SKIN INCISION: Incision looks good (approximately 8 cm length), No erythema and No Drainage, healed;  NEUROVASCULAR: is grossly intact. Positive distal pulses and capillary refill. DVT ASSESSMENT: No evidence of DVT seen on physical exam.  ROM: Improved ROM     CHART REVIEW     Past Medical History:   Diagnosis Date    Orthodontics braces     Current Outpatient Prescriptions   Medication Sig    HYDROcodone-acetaminophen (NORCO) 5-325 mg per tablet     oxyCODONE-acetaminophen (PERCOCET) 7.5-325 mg per tablet TAKE ONE TO TWO TABLETS BY MOUTH Q 4 - 6 HRS AS NEEDED FOR PAIN **AFTER SURGERY** DO NOT TAKE BEFORE SURGERY  Indications: Pain    aspirin (ASPIRIN) 325 mg tablet Take 1 Tab by mouth daily. **START TAKING AFTER SURGERY**    promethazine (PHENERGAN) 25 mg tablet Take 1 Tab by mouth every six (6) hours as needed for Nausea.  polyethylene glycol (MIRALAX) 17 gram packet Take 1 Packet by mouth daily.  acetaminophen (TYLENOL) 325 mg tablet Take  by mouth every four (4) hours as needed for Pain. No current facility-administered medications for this visit. No Known Allergies  Past Surgical History:   Procedure Laterality Date    HAND/FINGER SURGERY UNLISTED      HX ORTHOPAEDIC      finger    HX TONSIL AND ADENOIDECTOMY  2004     Social History     Occupational History    Not on file.      Social History Main Topics    Smoking status: Never Smoker    Smokeless tobacco: Never Used    Alcohol use Yes      Comment: ocassional     Drug use: Yes     Special: Marijuana      Comment: last use was 3/10/17    Sexual activity: Not on file     Family History   Problem Relation Age of Onset    Diabetes Maternal Grandmother        REVIEW OF SYSTEMS : 6/27/2017  ALL BELOW ARE Negative except : SEE HPI       Constitutional: Negative for fever, chills and weight loss. Neg Weigh Loss  Cardiovascular: Negative for chest pain, claudication and leg swelling. SOB, CARRASCO   Gastrointestinal: Negative for  pain, N/V/D/C, Blood in stool or urine,dysuria, hematuria,        Incontinence, pelvic pain  Musculoskeletal: see HPI. Neurological: Negative for dizziness and weakness. Negative for headaches,Visual Changes, Confusion, Seizures,   Psychiatric/Behavioral: Negative for depression, memory loss and substance abuse. Extremities:  Negative for  hair changes, rash or skin lesion changes. Hematologic: Negative for Bleeding problems, bruising, pallor or swollen lymph nodes. Peripheral Vascular: No calf pain, vascular vein tenderness to calf pain              No calf throbbing, posterior knee throbbing pain    DIAGNOSTIC IMAGING     No notes on file    Written by Nas Moss, as dictated by Artie Jauregui MD. I, , Artie Jauregui MD, confirm that all documentation is accurate.

## 2017-06-28 ENCOUNTER — HOSPITAL ENCOUNTER (OUTPATIENT)
Dept: PHYSICAL THERAPY | Age: 22
Discharge: HOME | End: 2017-06-28
Payer: COMMERCIAL

## 2017-06-28 PROCEDURE — 97035 APP MDLTY 1+ULTRASOUND EA 15: CPT

## 2017-06-28 PROCEDURE — 97110 THERAPEUTIC EXERCISES: CPT

## 2017-06-28 NOTE — PROGRESS NOTES
PT DAILY TREATMENT NOTE 3-16    Patient Name: Chi Snyder  Date:2017  : 1995  [x]  Patient  Verified  Payor: Reena Cleopatra / Plan: [employer] [facility] / Product Type: PPO /    In time:907  Out time:958  Total Treatment Time (min): 52  Visit #: 13 of     Treatment Area: Pain in right ankle and joints of right foot [M25.571]    SUBJECTIVE  Pain Level (0-10 scale): 0  Any medication changes, allergies to medications, adverse drug reactions, diagnosis change, or new procedure performed?: [x] No    [] Yes (see summary sheet for update)  Subjective functional status/changes:   [] No changes reported  Doing fine    OBJECTIVE  Modality rationale: decrease inflammation, decrease pain and increase tissue extensibility to improve the patients ability to perform increased ADL   Min Type Additional Details    [] Estim:  []Unatt       []IFC  []Premod                        []Other:  []w/ice   []w/heat  Position:  Location:    [] Estim: []Att    []TENS instruct  []NMES                    []Other:  []w/US   []w/ice   []w/heat  Position:  Location:    []  Traction: [] Cervical       []Lumbar                       [] Prone          []Supine                       []Intermittent   []Continuous Lbs:  [] before manual  [] after manual    [x]  Ultrasound: [x]Continuous   [] Pulsed     8 Min                      []1MHz   [x]3MHz Location: Achilles Tendon and plantar fascia  W/cm2: 1.5    []  Iontophoresis with dexamethasone         Location: [] Take home patch   [] In clinic    []  Ice     []  heat  []  Ice massage  []  Laser   []  Anodyne Position:  Location:    []  Laser with stim  []  Other: Position:  Location:    []  Vasopneumatic Device Pressure:       [] lo [] med [] hi   Temperature: [] lo [] med [] hi   [] Skin assessment post-treatment:  []intact []redness- no adverse reaction    []redness  adverse reaction:     42 min Therapeutic Exercise:  [x] See flow sheet :   Rationale: increase ROM, increase strength and improve coordination to improve the patients ability to tolerate increased activity levels               With   [x] TE   [] TA   [] neuro   [] other: Patient Education: [x] Review HEP    [] Progressed/Changed HEP based on:   [] positioning   [] body mechanics   [] transfers   [] heat/ice application    [] other:      Other Objective/Functional Measures:   - DF Improved to -3*  - Pt amb w/o crutches today with ease in CAM Boot       Pain Level (0-10 scale) post treatment: 0    ASSESSMENT/Changes in Function:      Patient will continue to benefit from skilled PT services to modify and progress therapeutic interventions, address functional mobility deficits, address ROM deficits, address strength deficits, analyze and address soft tissue restrictions, analyze and cue movement patterns and analyze and modify body mechanics/ergonomics to attain remaining goals. []  See Plan of Care  []  See progress note/recertification  []  See Discharge Summary         Progress towards goals / Updated goals:   Short Term Goals: To be accomplished in 5 treatments:  1. Demonstrate (I) with HEP  Eval Status: Initiated  Current Status: Met 6/2/17 6/21/17  2. Pt to tolerate 30 min or more of TE and/or Interventions w/o increased s/s  Eval Status: Initiated  Current Status: Progressing at 18 minutes 6/5/17   26 minutes 6/21/17  3. Increase DF in (R) Foot to -15 or better for progress to normal ROM   Eval Status: Initiated  Current Status: Good progress at -8 today 6/9/17 -5 6/12/17 Following manual      Long Term Goals: To be accomplished in 18 treatments:  1. Pt will Amb on TM 1/4 mile w/o increased s/s  Eval Status: Initiated  Current Status: Pt amb 400' WBAT B CR and regular shoes 6/21/17  2. Pt will have (R) foot AROM DF to 5* or better for good ankle mobility for a normal gait pattern  Eval Status: Initiated  Current Status: Progressing at -3 today 6/28/17  3.  Pt will report 50% improvement with impairment since start of treatment  Eval Status: Initiated  Current Status: 60-70% 6/12/17  4. Pt will amb 1/2 mile on TM with little to no difficulty to show improvement in function and progress to PLOF  Eval Status: Initiated  Current Status: Pt. amb 400' WBAT B CR and regular shoes 6/21/17  5.  Pt will improve FOTO score to 61 in 16 visits to show significant improvement in function for progress to (I) community ambulation  Eval Status: 28  Current Status: Progressing at 37 6/28/17    PLAN  []  Upgrade activities as tolerated     []  Continue plan of care  []  Update interventions per flow sheet       []  Discharge due to:_  []  Other:_      Bejuma Santana, PT 6/28/2017  9:54 AM    Future Appointments  Date Time Provider Jailyn Fernando   6/30/2017 9:00 AM [address] [facility]   7/25/2017 10:50 AM Korey Smith MD [facility] [address] [address]

## 2017-06-30 ENCOUNTER — HOSPITAL ENCOUNTER (OUTPATIENT)
Dept: PHYSICAL THERAPY | Age: 22
Discharge: HOME | End: 2017-06-30
Payer: COMMERCIAL

## 2017-06-30 PROCEDURE — 97110 THERAPEUTIC EXERCISES: CPT

## 2017-06-30 PROCEDURE — 97140 MANUAL THERAPY 1/> REGIONS: CPT

## 2017-06-30 PROCEDURE — 97116 GAIT TRAINING THERAPY: CPT

## 2017-06-30 NOTE — PROGRESS NOTES
PT DAILY TREATMENT NOTE 3-16    Patient Name: Antoinette Reynolds  Date:2017  : 1995  [x]  Patient  Verified  Payor: BLUE CROSS / Plan: [employer] [facility] [place] / Product Type: PPO /    In time:9:59  Out time:9:38  Total Treatment Time (min): 39  Visit #: 14 of     Treatment Area: Pain in right ankle and joints of right foot [M25.571]    SUBJECTIVE  Pain Level (0-10 scale): 0  Any medication changes, allergies to medications, adverse drug reactions, diagnosis change, or new procedure performed?: [x] No    [] Yes (see summary sheet for update)  Subjective functional status/changes:   [] No changes reported  \"I'm walking on it more and more. \"    OBJECTIVE  Modality rationale: PD   Min Type Additional Details    [] Estim:  []Unatt       []IFC  []Premod                        []Other:  []w/ice   []w/heat  Position:  Location:    [] Estim: []Att    []TENS instruct  []NMES                    []Other:  []w/US   []w/ice   []w/heat  Position:  Location:    []  Traction: [] Cervical       []Lumbar                       [] Prone          []Supine                       []Intermittent   []Continuous Lbs:  [] before manual  [] after manual    []  Ultrasound: []Continuous   [] Pulsed                           []1MHz   []3MHz Location:  W/cm2:    []  Iontophoresis with dexamethasone         Location: [] Take home patch   [] In clinic    []  Ice     []  heat  []  Ice massage  []  Laser   []  Anodyne Position:  Location:    []  Laser with stim  []  Other: Position:  Location:    []  Vasopneumatic Device Pressure:       [] lo [] med [] hi   Temperature: [] lo [] med [] hi   [] Skin assessment post-treatment:  []intact []redness- no adverse reaction    []redness  adverse reaction:     8 min Manual Therapy:  PROM to right ankle, (R) TC grade II mobs   Rationale: decrease pain, increase ROM and increase tissue extensibility to increase ease with ADLs and age appropriate tasks    23 min Therapeutic Exercise:  [x] See flow sheet :   Rationale: increase ROM, increase strength and improve coordination to improve the patients ability to increase ease with ADLs    8 min Gait Trainin feet without device/CAM boot on level surfaces    Rationale: to normalize gait without CAM boot             With   [] TE   [] TA   [] neuro   [] other: Patient Education: [x] Review HEP    [] Progressed/Changed HEP based on:   [] positioning   [] body mechanics   [] transfers   [] heat/ice application    [] other:      Other Objective/Functional Measures:   Cues to avoid accessory joint movement with ankle theraband 4-way  Patient reports no pain with therex   Patient with minimal TR range  Noted symmetrical weight bear through B LEs with sit<->stand and no UE assist    Patient is able to ambulate x200 feet around gym without CAM boot(patient is wearing sneakers), presents with limp on right, cues for right heel strike    Pain Level (0-10 scale) post treatment: 0    ASSESSMENT/Changes in Function: Patient with good tolerance to therex with no reports of pain. Patient presents with limp on right during gait training and decreased right heel strike secondary to decreased DF strength. Will continue to focus on left ankle strengthening for improved gait quality. Patient will continue to benefit from skilled PT services to modify and progress therapeutic interventions, address functional mobility deficits, address ROM deficits, address strength deficits, analyze and address soft tissue restrictions, analyze and cue movement patterns, analyze and modify body mechanics/ergonomics and assess and modify postural abnormalities to attain remaining goals. []  See Plan of Care  []  See progress note/recertification  []  See Discharge Summary         Progress towards goals / Updated goals:   Short Term Goals: To be accomplished in 5 treatments:  1. Demonstrate (I) with HEP  Eval Status: Initiated  Current Status: Met 17  2.  Pt to tolerate 30 min or more of TE and/or Interventions w/o increased s/s  Eval Status: Initiated  Current Status: Progressing at 18 minutes 6/5/17   26 minutes 6/21/17  3. Increase DF in (R) Foot to -15 or better for progress to normal ROM   Eval Status: Initiated  Current Status: Good progress at -8 today 6/9/17 -5 6/12/17 Following manual      Long Term Goals: To be accomplished in 18 treatments:  1. Pt will Amb on TM 1/4 mile w/o increased s/s  Eval Status: Initiated  Current Status: Pt amb 400' WBAT B CR and regular shoes 6/21/17  2. Pt will have (R) foot AROM DF to 5* or better for good ankle mobility for a normal gait pattern  Eval Status: Initiated  Current Status: Progressing at -3 today 6/28/17  3. Pt will report 50% improvement with impairment since start of treatment  Eval Status: Initiated  Current Status: 60-70% 6/12/17  4. Pt will amb 1/2 mile on TM with little to no difficulty to show improvement in function and progress to PLOF  Eval Status: Initiated  Current Status: Pt. amb 400' WBAT B CR and regular shoes 6/21/17  5.  Pt will improve FOTO score to 61 in 16 visits to show significant improvement in function for progress to (I) community ambulation    PLAN  []  Upgrade activities as tolerated     [x]  Continue plan of care  []  Update interventions per flow sheet       []  Discharge due to:_  []  Other:_      Jw Murray 6/30/2017  8:52 AM    Future Appointments  Date Time Provider Jailyn Fernando   6/30/2017 9:00 AM [address] [place] [facility]   7/25/2017 10:50 AM Loco De La Garza MD [facility]

## 2017-07-05 ENCOUNTER — HOSPITAL ENCOUNTER (OUTPATIENT)
Dept: PHYSICAL THERAPY | Age: 22
Discharge: HOME | End: 2017-07-05
Payer: COMMERCIAL

## 2017-07-05 PROCEDURE — 97110 THERAPEUTIC EXERCISES: CPT

## 2017-07-05 PROCEDURE — 97140 MANUAL THERAPY 1/> REGIONS: CPT

## 2017-07-05 NOTE — PROGRESS NOTES
PT DAILY TREATMENT NOTE - [facility]     Patient Name: Aren Lock  Date:2017  : 1995  [x]  Patient  Verified  Payor: BLUE CROSS / Plan: [employer] [facility] / Product Type: PPO /    In time:9:11  Out time:9:45  Total Treatment Time (min):  33  Visit #: 15 of 18    Treatment Area: Pain in right ankle and joints of right foot [M25.571]    SUBJECTIVE  Pain Level (0-10 scale): 6-7  Any medication changes, allergies to medications, adverse drug reactions, diagnosis change, or new procedure performed?: [x] No    [] Yes (see summary sheet for update)  Subjective functional status/changes:   [] No changes reported  I ha a  Bad  Night.     OBJECTIVE    Modality rationale: decrease edema, decrease inflammation, decrease pain and increase tissue extensibility to improve the patients ability to perform ADL   Min Type Additional Details    [] Estim:  []Unatt       []IFC  []Premod                        []Other:  []w/ice   []w/heat  Position:  Location:    [] Estim: []Att    []TENS instruct  []NMES                    []Other:  []w/US   []w/ice   []w/heat  Position:  Location:    []  Traction: [] Cervical       []Lumbar                       [] Prone          []Supine                       []Intermittent   []Continuous Lbs:  [] before manual  [] after manual    []  Ultrasound: []Continuous   [] Pulsed                           []1MHz   []3MHz W/cm2:  Location:    []  Iontophoresis with dexamethasone         Location: [] Take home patch   [] In clinic    []  Ice     []  heat  []  Ice massage  []  Laser   []  Anodyne Position:  Location:    []  Laser with stim  []  Other:  Position:  Location:    []  Vasopneumatic Device Pressure:       [] lo [] med [] hi   Temperature: [] lo [] med [] hi   [x] Skin assessment post-treatment:  [x]intact []redness- no adverse reaction    []redness  adverse reaction:      min []Eval                  []Re-Eval       25 min Therapeutic Exercise:  [x] See flow sheet :   Rationale: increase ROM and increase strength to improve the patients ability to perform ADL      min Therapeutic Activity:  []  See flow sheet :   Rationale:   to improve the patients ability to       min Neuromuscular Re-education:  []  See flow sheet :   Rationale:   to improve the patients ability to     8 min Manual Therapy: Ankle  JT  mob   Rationale: decrease pain, increase ROM and increase tissue extensibility to perform ADL     min Gait Training:  ___ feet with ___ device on level surfaces with ___ level of assist   Rationale: With   [] TE   [] TA   [] neuro   [] other: Patient Education: [x] Review HEP    [] Progressed/Changed HEP based on:   [] positioning   [] body mechanics   [] transfers   [] heat/ice application    [] other:      Other Objective/Functional Measures:  Responded  Fairly  Well  To each there ex. Pain Level (0-10 scale) post treatment: 0    ASSESSMENT/Changes in Function: Benefited  With treatment. Patient will continue to benefit from skilled PT services to address functional mobility deficits, address ROM deficits, address strength deficits, analyze and address soft tissue restrictions, analyze and cue movement patterns and instruct in home and community integration to attain remaining goals. [x]  See Plan of Care  []  See progress note/recertification  []  See Discharge Summary         Progress towards goals / Updated goals:   Short Term Goals: To be accomplished in 5 treatments:  1. Demonstrate (I) with HEP  Eval Status: Initiated  Current Status: Met 6/2/17 6/21/17  2. Pt to tolerate 30 min or more of TE and/or Interventions w/o increased s/s  Eval Status: Initiated  Current Status: Progressing at 18 minutes 6/5/17   26 minutes 6/21/17  3. Increase DF in (R) Foot to -15 or better for progress to normal ROM   Eval Status: Initiated  Current Status: Good progress at -8 today 6/9/17 -5 6/12/17 Following manual      Long Term Goals: To be accomplished in 18 treatments:  1. Pt will Amb on TM 1/4 mile w/o increased s/s  Eval Status: Initiated  Current Status: Pt amb 400' WBAT B CR and regular shoes 6/21/17  2. Pt will have (R) foot AROM DF to 5* or better for good ankle mobility for a normal gait pattern  Eval Status: Initiated  Current Status: Progressing at -3 today 6/28/17  3. Pt will report 50% improvement with impairment since start of treatment  Eval Status: Initiated  Current Status: 60-70% 6/12/17  4. Pt will amb 1/2 mile on TM with little to no difficulty to show improvement in function and progress to PLOF  Eval Status: Initiated  Current Status: Pt. amb 400' WBAT B CR and regular shoes 6/21/17  5.  Pt will improve FOTO score to 61 in 16 visits to show significant improvement in function for progress to (I) community ambulation       PLAN  []  Upgrade activities as tolerated     [x]  Continue plan of care  []  Update interventions per flow sheet       []  Discharge due to:_  [x]  Other:_   Ambulation  [place]    Maggie Negron PTA 7/5/2017  9:31 AM    Future Appointments  Date Time Provider Jailyn Fernando   7/13/2017 2:30 PM Maggie Oconnor PTA [facility]   7/14/2017 3:00 PM aMggie Negron PTA [facility]   7/18/2017 9:30 AM Maggie Negron PTA [facility]   7/20/2017 10:30 AM Phillip Dee, PT [facility]   7/25/2017 10:50 AM MD Mary Beth Santiago   7/25/2017 1:30 PM Phillip Dee, PT [facility]   7/27/2017 9:00 AM Rachana Pearl, PT [facility]

## 2017-07-13 ENCOUNTER — HOSPITAL ENCOUNTER (OUTPATIENT)
Dept: PHYSICAL THERAPY | Age: 22
Discharge: HOME | End: 2017-07-13
Payer: COMMERCIAL

## 2017-07-13 PROCEDURE — 97140 MANUAL THERAPY 1/> REGIONS: CPT

## 2017-07-13 PROCEDURE — 97110 THERAPEUTIC EXERCISES: CPT

## 2017-07-13 NOTE — PROGRESS NOTES
PT DAILY TREATMENT NOTE - [facility]     Patient Name: Larisa Ring  Date:2017  : 1995  [x]  Patient  Verified  Payor: BLUE CROSS / Plan: [facility] / Product Type: PPO /    In time:2:35  Out time:3:24  Total Treatment Time (min): 43  Visit #: 16 of 18    Treatment Area: Pain in right ankle and joints of right foot [M25.571]    SUBJECTIVE  Pain Level (0-10 scale): 0  Any medication changes, allergies to medications, adverse drug reactions, diagnosis change, or new procedure performed?: [x] No    [] Yes (see summary sheet for update)  Subjective functional status/changes:   [] No changes reported  No pain.     OBJECTIVE    Modality rationale: decrease edema, decrease inflammation and decrease pain to improve the patients ability to perform ADL    Min Type Additional Details    [] Estim:  []Unatt       []IFC  []Premod                        []Other:  []w/ice   []w/heat  Position:  Location:    [] Estim: []Att    []TENS instruct  []NMES                    []Other:  []w/US   []w/ice   []w/heat  Position:  Location:    []  Traction: [] Cervical       []Lumbar                       [] Prone          []Supine                       []Intermittent   []Continuous Lbs:  [] before manual  [] after manual    []  Ultrasound: []Continuous   [] Pulsed                           []1MHz   []3MHz W/cm2:  Location:    []  Iontophoresis with dexamethasone         Location: [] Take home patch   [] In clinic   10 [x]  Ice  post   []  heat  []  Ice massage  []  Laser   []  Anodyne Position:long  sit  Location:(R)  ankle    []  Laser with stim  []  Other:  Position:  Location:    []  Vasopneumatic Device Pressure:       [] lo [] med [] hi   Temperature: [] lo [] med [] hi   [x] Skin assessment post-treatment:  [x]intact []redness- no adverse reaction    []redness  adverse reaction:      min []Eval                  []Re-Eval       23 min Therapeutic Exercise:  [x] See flow sheet :   Rationale: increase ROM and increase strength to improve the patients ability to perform ADL      min Therapeutic Activity:  []  See flow sheet :   Rationale: increase ROM and increase strength  to improve the patients ability to perform ADL       min Neuromuscular Re-education:  []  See flow sheet :   Rationale:   to improve the patients ability to     10 min Manual Therapy: Ankle  Passive  Stretch  All plane   Rationale: decrease pain, increase ROM and increase tissue extensibility to perform ADL      min Gait Training:  ___ feet with ___ device on level surfaces with ___ level of assist   Rationale: With   [] TE   [] TA   [] neuro   [] other: Patient Education: [x] Review HEP    [] Progressed/Changed HEP based on:   [] positioning   [] body mechanics   [] transfers   [] heat/ice application    [] other:      Other Objective/Functional Measures: DF  +3     Pain Level (0-10 scale) post treatment: 0    ASSESSMENT/Changes in Function: Improved  In knee  DF. Good response  To each there ex. Patient will continue to benefit from skilled PT services to address functional mobility deficits, address ROM deficits, address strength deficits, analyze and address soft tissue restrictions, analyze and cue movement patterns and instruct in home and community integration to attain remaining goals. []  See Plan of Care  []  See progress note/recertification  []  See Discharge Summary         Progress towards goals / Updated goals:  Short Term Goals: To be accomplished in 5 treatments:  1. Demonstrate (I) with HEP  Eval Status: Initiated  Current Status: Met 6/2/17 6/21/17  2. Pt to tolerate 30 min or more of TE and/or Interventions w/o increased s/s  Eval Status: Initiated  Current Status: Progressing at 18 minutes 6/5/17   26 minutes 6/21/17  3.  Increase DF in (R) Foot to -15 or better for progress to normal ROM   Eval Status: Initiated  Current Status: Good progress at -8 today 6/9/17 -5 6/12/17 Following manual      Long Term Goals: To be accomplished in 18 treatments:  1. Pt will Amb on TM 1/4 mile w/o increased s/s  Eval Status: Initiated  Current Status: Pt amb 400' WBAT B CR and regular shoes 6/21/17  2. Pt will have (R) foot AROM DF to 5* or better for good ankle mobility for a normal gait pattern  Eval Status: Initiated  Current Status: Progressing at +3  7/13/17  3. Pt will report 50% improvement with impairment since start of treatment  Eval Status: Initiated  Current Status: 60-70% 6/12/17  4. Pt will amb 1/2 mile on TM with little to no difficulty to show improvement in function and progress to PLOF  Eval Status: Initiated  Current Status: [address]  7/13/17  5.  Pt will improve FOTO score to 61 in 16 visits to show significant improvement in function for progress to (I) community ambulation       PLAN  []  Upgrade activities as tolerated     [x]  Continue plan of care  []  Update interventions per flow sheet       []  Discharge due to:_  []  Other:_      Genevieve Schwartz PTA 7/13/2017  2:44 PM    Future Appointments  Date Time Provider Jailyn Fernando   7/14/2017 3:00 PM Maggie Oconnor PTA [facility]   7/18/2017 9:30 AM Maggie Negron PTA [facility]   7/20/2017 10:30 AM Char Chris, PT [facility]   7/25/2017 10:50 AM MD Mirella Kate    7/25/2017 1:30 PM Char Chris, PT [facility]   7/27/2017 9:00 AM Aye Flynn PT [facility]

## 2017-07-14 ENCOUNTER — HOSPITAL ENCOUNTER (OUTPATIENT)
Dept: PHYSICAL THERAPY | Age: 22
Discharge: HOME | End: 2017-07-14
Payer: COMMERCIAL

## 2017-07-14 ENCOUNTER — TELEPHONE (OUTPATIENT)
Dept: ORTHOPEDIC SURGERY | Age: 22
End: 2017-07-14

## 2017-07-14 PROCEDURE — 97110 THERAPEUTIC EXERCISES: CPT

## 2017-07-14 NOTE — PROGRESS NOTES
PT DAILY TREATMENT NOTE - [facility]     Patient Name: Cristopher Carrillo  Date:2017  : 1995  [x]  Patient  Verified  Payor: BLUE CROSS / Plan: [employer] [facility] / Product Type: PPO /    In time:2:59  Out time:3:32  Total Treatment Time (min):  33  Visit #: 17 of 18    Treatment Area: Pain in right ankle and joints of right foot [M25.571]    SUBJECTIVE  Pain Level (0-10 scale): 0  Any medication changes, allergies to medications, adverse drug reactions, diagnosis change, or new procedure performed?: [x] No    [] Yes (see summary sheet for update)  Subjective functional status/changes:   [] No changes reported  No pain    OBJECTIVE    Modality rationale: decrease edema, decrease inflammation, decrease pain and increase tissue extensibility to improve the patients ability to perform ADL    Min Type Additional Details    [] Estim:  []Unatt       []IFC  []Premod                        []Other:  []w/ice   []w/heat  Position:  Location:    [] Estim: []Att    []TENS instruct  []NMES                    []Other:  []w/US   []w/ice   []w/heat  Position:  Location:    []  Traction: [] Cervical       []Lumbar                       [] Prone          []Supine                       []Intermittent   []Continuous Lbs:  [] before manual  [] after manual    []  Ultrasound: []Continuous   [] Pulsed                           []1MHz   []3MHz W/cm2:  Location:    []  Iontophoresis with dexamethasone         Location: [] Take home patch   [] In clinic    []  Ice     []  heat  []  Ice massage  []  Laser   []  Anodyne Position:  Location:    []  Laser with stim  []  Other:  Position:  Location:    []  Vasopneumatic Device Pressure:       [] lo [] med [] hi   Temperature: [] lo [] med [] hi   [x] Skin assessment post-treatment:  [x]intact []redness- no adverse reaction    []redness  adverse reaction:      min []Eval                  []Re-Eval       33 min Therapeutic Exercise:  [x] See flow sheet :   Rationale: increase ROM and increase strength to improve the patients ability to perform ADL      min Therapeutic Activity:  []  See flow sheet :   Rationale:   to improve the patients ability to       min Neuromuscular Re-education:  []  See flow sheet :   Rationale:   to improve the patients ability to      min Manual Therapy:     Rationale:  to      min Gait Training:  ___ feet with ___ device on level surfaces with ___ level of assist   Rationale: With   [x] TE   [] TA   [] neuro   [] other: Patient Education: [x] Review HEP    [] Progressed/Changed HEP based on:   [] positioning   [] body mechanics   [] transfers   [] heat/ice application    [] other:      Other Objective/Functional Measures:  Completed  Each there ex fairly well. Pain Level (0-10 scale) post treatment: 0    ASSESSMENT/Changes in Function: Benefited  With treatment. Patient will continue to benefit from skilled PT services to address functional mobility deficits, address ROM deficits, address strength deficits, analyze and address soft tissue restrictions, analyze and cue movement patterns and instruct in home and community integration to attain remaining goals. [x]  See Plan of Care  []  See progress note/recertification  []  See Discharge Summary         Progress towards goals / Updated goals:  Short Term Goals: To be accomplished in 5 treatments:  1. Demonstrate (I) with HEP  Eval Status: Initiated  Current Status: Met 6/2/17 6/21/17  2. Pt to tolerate 30 min or more of TE and/or Interventions w/o increased s/s  Eval Status: Initiated  Current Status: Progressing at 18 minutes 6/5/17   26 minutes 6/21/17  3. Increase DF in (R) Foot to -15 or better for progress to normal ROM   Eval Status: Initiated  Current Status: Good progress at -8 today 6/9/17 -5 6/12/17 Following manual      Long Term Goals: To be accomplished in 18 treatments:  1.  Pt will Amb on TM 1/4 mile w/o increased s/s  Eval Status: Initiated  Current Status: Pt amb 400' WBAT B CR and regular shoes 6/21/17  2. Pt will have (R) foot AROM DF to 5* or better for good ankle mobility for a normal gait pattern  Eval Status: Initiated  Current Status: Progressing at +3  7/13/17  3. Pt will report 50% improvement with impairment since start of treatment  Eval Status: Initiated  Current Status: 60-70% 6/12/17  4. Pt will amb 1/2 mile on TM with little to no difficulty to show improvement in function and progress to PLOF  Eval Status: Initiated  Current Status: [address]  7/13/17  5.  Pt will improve FOTO score to 61 in 16 visits to show sign    PLAN  []  Upgrade activities as tolerated     [x]  Continue plan of care  []  Update interventions per flow sheet       []  Discharge due to:_  [x]  Other:_ REASSESS GOALS  MD NOTE  [place]     [address] [facility] 7/14/2017  3:36 PM    Future Appointments  Date Time Provider Jailyn Fernando   7/18/2017 9:30 AM [address] [place] [address] [facility]   7/20/2017 10:30 AM Kelsea Sue PT [facility]   7/25/2017 10:50 AM Cheyanne Hassan MD [place] [address]   7/25/2017 1:30 PM Kelsea Sue PT [facility]   7/27/2017 9:00 AM Elle French PT [facility]

## 2017-07-14 NOTE — TELEPHONE ENCOUNTER
Patient was last seen on 6/27/17 and was provided a note to be out of work for 5 weeks (unitl 8/1/17) and was to follow up in the office in 4 weeks (wee of 7/24/17). Patient bautista not have a follow up appointment scheduled and needs to be seen back in the office prior to providing a new work note.     Kelly Goodell, PA-C  7/14/2017   11:48 AM

## 2017-07-14 NOTE — TELEPHONE ENCOUNTER
Pt called in requesting to get a letter for work stating about light duty and certain Hours.  Please contact pt at [phone]

## 2017-07-18 ENCOUNTER — APPOINTMENT (OUTPATIENT)
Dept: PHYSICAL THERAPY | Age: 22
End: 2017-07-18
Payer: COMMERCIAL

## 2017-07-20 ENCOUNTER — HOSPITAL ENCOUNTER (OUTPATIENT)
Dept: PHYSICAL THERAPY | Age: 22
Discharge: HOME | End: 2017-07-20
Payer: COMMERCIAL

## 2017-07-20 PROCEDURE — 97110 THERAPEUTIC EXERCISES: CPT

## 2017-07-20 PROCEDURE — 97140 MANUAL THERAPY 1/> REGIONS: CPT

## 2017-07-20 NOTE — PROGRESS NOTES
PT DAILY TREATMENT NOTE     Patient Name: Ulysses Reading  Date:2017  : 1995  [x]  Patient  Verified  Payor: BLUE CROSS / Plan: [employer] [facility] [place] / Product Type: PPO /    In time:956 Out time:1047  Total Treatment Time (min): 48  Visit #: 20 of 18     Count got off on 17  Treatment Area: Pain in right ankle and joints of right foot [M25.571]    SUBJECTIVE  Pain Level (0-10 scale): 0  Any medication changes, allergies to medications, adverse drug reactions, diagnosis change, or new procedure performed?: [x] No    [] Yes (see summary sheet for update)  Subjective functional status/changes:   [] No changes reported  I am not having any pain. I see the doctor on the  next week.     OBJECTIVE    Modality rationale: increase tissue extensibility to improve the patients ability to aid with increase tolerance to ADLs and activities   Min Type Additional Details    [] Estim:  []Unatt       []IFC  []Premod                        []Other:  []w/ice   []w/heat  Position:  Location:    [] Estim: []Att    []TENS instruct  []NMES                    []Other:  []w/US   []w/ice   []w/heat  Position:  Location:    []  Traction: [] Cervical       []Lumbar                       [] Prone          []Supine                       []Intermittent   []Continuous Lbs:  [] before manual  [] after manual    []  Ultrasound: []Continuous   [] Pulsed                           []1MHz   []3MHz W/cm2:  Location:    []  Iontophoresis with dexamethasone         Location: [] Take home patch   [] In clinic   10 [x]  Ice  post   []  heat  []  Ice massage  []  Laser   []  Anodyne Position:reclined  Location:R ankle foot    []  Laser with stim  []  Other:  Position:  Location:    []  Vasopneumatic Device Pressure:       [] lo [] med [] hi   Temperature: [] lo [] med [] hi   [] Skin assessment post-treatment:  []intact []redness- no adverse reaction    []redness  adverse reaction:      min []Eval                  []Re-Eval 25 min Therapeutic Exercise:  [x] See flow sheet :   Rationale: increase ROM, increase strength and improve coordination to improve the patients ability to aid with increase tolerance to ADLS and activities     min Therapeutic Activity:  []  See flow sheet :   Rationale:   to improve the patients ability to       min Neuromuscular Re-education:  []  See flow sheet :   Rationale:   to improve the patients ability to     13 min Manual Therapy:  PROM, effleurage gentle joint mobs   Rationale: increase ROM and increase tissue extensibility to aid with increase tolerance to ADLS and activities     min Gait Training:  ___ feet with ___ device on level surfaces with ___ level of assist   Rationale: With   [] TE   [] TA   [] neuro   [] other: Patient Education: [x] Review HEP    [] Progressed/Changed HEP based on:   [] positioning   [] body mechanics   [] transfers   [] heat/ice application    [] other:      Other Objective/Functional Measures: DF in longsitting -30N - +3 N  FOTO  62    TM . 19 miles at 2.5 MPH  5 minutes   Increased Tband to 15X    Pain Level (0-10 scale) post treatment:0    ASSESSMENT/Changes in Function: Tolerated well. Patient will continue to benefit from skilled PT services to modify and progress therapeutic interventions, address functional mobility deficits, address ROM deficits, address strength deficits, analyze and address soft tissue restrictions, analyze and cue movement patterns, analyze and modify body mechanics/ergonomics, assess and modify postural abnormalities and instruct in home and community integration to attain remaining goals. [x]  See Plan of Care  []  See progress note/recertification  []  See Discharge Summary         Progress towards goals / Updated goals:  Short Term Goals: To be accomplished in 5 treatments:  1. Demonstrate (I) with HEP  Eval Status: Initiated  Current Status: Met 6/2/17 6/21/17 7/20/17  2.  Pt to tolerate 30 min or more of TE and/or Interventions w/o increased s/s  Eval Status: Initiated  Current Status: Progressing at 18 minutes 6/5/17   26 minutes 6/21/17    3. Increase DF in (R) Foot to -15 or better for progress to normal ROM   Eval Status: Initiated  Current Status: -30N - +3 N 7/20/17      Long Term Goals: To be accomplished in 18 treatments:  1. Pt will Amb on TM 1/4 mile w/o increased s/s  Eval Status: Initiated  Current Status:  TM .19 miles at 2.5 MPH  5 minutes  7/20/17  2. Pt will have (R) foot AROM DF to 5* or better for good ankle mobility for a normal gait pattern  Eval Status: Initiated  Current Status: Progressing at +3  7/20/17  3. Pt will report 50% improvement with impairment since start of treatment  Eval Status: Initiated  Current Status: 70%     7/20/17  4. Pt will amb 1/2 mile on TM with little to no difficulty to show improvement in function and progress to PLOF  Eval Status: Initiated  Current Status:  TM .19 miles at 2.5 MPH  5 minutes 7/20/17     5.  Pt will improve FOTO score to 61 in 16 visits to show sign  EVAL 28  CURRENT 62 7/20/17    PLAN  [x]  Upgrade activities as tolerated     [x]  Continue plan of care  []  Update interventions per flow sheet       []  Discharge due to:_  []  Other:_      Kb Lopez, PT 7/20/2017  9:56 AM    Future Appointments  Date Time Provider Jailyn Fernando   7/20/2017 10:30 AM Kb Lopez, PT [facility]   7/25/2017 10:50 AM Lady Vonnie MD [place] [id number]   7/25/2017 1:30 PM Kb Lopez, PT [facility]   7/27/2017 9:00 AM Edilia Bunch PT [facility]

## 2017-07-20 NOTE — PROGRESS NOTES
[facility]  [address] [facility], [address],[place]  [phone] fax    Physician Update  [x] Progress Note  [] Discharge Summary  Patient name: Victoria Sanchez Start of Care: 17   Referral source: Kaushik Ramirez MD : 1995   Medical/Treatment Diagnosis: Pain in right ankle and joints of right foot [M25.571] Onset Date:17 followed by surgery 17      Prior Hospitalization: see medical history Provider#: [id number]   Medications: Verified on Patient Summary List    Comorbidities:none noted  Prior Level of Function: Able to play basketball and hang out with friends w/o (R) angle pain, Walk w/o crutches    Visits from Start of Care:20    Missed Visits:1    Status at Evaluation/Last Progress Note:Please see MD note dated 17  Progress towards Goals: Pain 0, now ambulating on TM speed 2.5 MPH and no increase pain . 19 miles for 5 minutes. FOTO now 62, DF in long sitting -30N - +3 N. Patient demonstrates the potential to make further gains with improving ROM, strength, endurance/activity tolerance, functional FOTO survey score   and all within a reasonable time frame so as to further increase their functional independence with ADLs and activities for carryover to  Improved quality of life and tolerance to community activities. Patient requires skilled Physical Therapy so as to monitor their response to and modify their treatment plan accordingly. Patient appears to be an appropriate candidate for skilled outpatient Physical Therapy. Goals: to be achieved in 10 treatments:  1. Pt will Amb on TM 1/2mile w/o increased s/s  PN TM . 19 miles at 2.5 MPH  5 minutes  17  Current Status:    2.  Pt will have (R) foot AROM DF to 5* or better for good ankle mobility for a normal gait pattern  PN Progressing at +3  17  Current Status:       ASSESSMENT/RECOMMENDATIONS:  [x]Continue therapy per initial plan/protocol at a frequency of 2-3 x per week for 10 treatments   []Continue therapy with the following recommended changes:_____________________      _____________________________________________________________________  []Discontinue therapy progressing towards or have reached established goals  []Discontinue therapy due to lack of appreciable progress towards goals  []Discontinue therapy due to lack of attendance or compliance  []Await Physician's recommendations/decisions regarding therapy  []Other:________________________________________________________________    Thank you for this referral. Jayjay Hung, PT 7/20/2017 10:02 AM  NOTE TO PHYSICIAN:  PLEASE COMPLETE THE ORDERS BELOW AND   FAX TO [facility]: [phone]  If you are unable to process this request in 24 hours please contact our office: [phone]    ? I have read the above report and request that my patient continue as recommended. ? I have read the above report and request that my patient continue therapy with the following changes/special instructions:_____________________________________  ? I have read the above report and request that my patient be discharged from therapy.     Physicians signature: __________________________Date: ________Time:________

## 2017-07-25 ENCOUNTER — OFFICE VISIT (OUTPATIENT)
Dept: ORTHOPEDIC SURGERY | Age: 22
End: 2017-07-25

## 2017-07-25 ENCOUNTER — HOSPITAL ENCOUNTER (OUTPATIENT)
Dept: PHYSICAL THERAPY | Age: 22
Discharge: HOME | End: 2017-07-25
Payer: COMMERCIAL

## 2017-07-25 VITALS
RESPIRATION RATE: 16 BRPM | BODY MASS INDEX: 22.36 KG/M2 | TEMPERATURE: 97.6 F | HEART RATE: 67 BPM | OXYGEN SATURATION: 100 % | DIASTOLIC BLOOD PRESSURE: 76 MMHG | WEIGHT: 174.2 LBS | SYSTOLIC BLOOD PRESSURE: 128 MMHG | HEIGHT: 74 IN

## 2017-07-25 DIAGNOSIS — S86.011D ACHILLES TENDON RUPTURE, RIGHT, SUBSEQUENT ENCOUNTER: Primary | ICD-10-CM

## 2017-07-25 DIAGNOSIS — Z98.890 POST-OPERATIVE STATE: ICD-10-CM

## 2017-07-25 PROCEDURE — 97110 THERAPEUTIC EXERCISES: CPT

## 2017-07-25 PROCEDURE — 97140 MANUAL THERAPY 1/> REGIONS: CPT

## 2017-07-25 NOTE — PROGRESS NOTES
Patient: Jolanta Partida                MRN: [id number]       SSN: [id number]  YOB: 1995                 AGE: 25 y.o. SEX: male    [address] [facility]    DOS: 4/13/17    Chief Complaint:   Chief Complaint   Patient presents with    Foot Pain     Right         HPI AND PHYSICAL EXAM:     Patient is a 25 y.o. male who presents today for follow up evaluation of primary repair of right Achilles' tendon. Patient states that he  has improved since last seen in the office on 6/27/2017 with Dr. Daniel Haynes. The patient's current pain rating is at a level of 0/10. PT has helped. The patient's prior history is detailed in the previous encounter. At the last visit,  the patient's plan was as follows:    1) Work note to remain out of work for addition 4-6 weeks  2) Formal PT WB as tolerated. No single stance raises  3) RTO - 4 weeks    His plans are to enter the [employer] and states he may need some form of documentation toward the end of his treatment. He is receiving therapy at [facility]. The plan is to continue physical therapy, continue advancing weightbearing as tolerated. No single-stance rising and no running. Otherwise, he is doing quite well. The incision looks fantastic. There are no signs of any infection and no signs of any dehiscence. He may return to work on light duty (no running, climbing, crawling, squatting, jumping or heavy lifting. Visit Vitals    /76    Pulse 67    Temp 97.6 °F (36.4 °C) (Oral)    Resp 16    Ht 6' 2\" (1.88 m)    Wt 174 lb 3.2 oz (79 kg)    SpO2 100%    BMI 22.37 kg/m2       Patient is a well developed, well nourished 25 y.o. male alert and oriented x 3 in no acute distress.      RIGHT ANKLE:  APPEARANCE: alert, well appearing, and in no distress. PSYCH: Normal affect, mood, and conduct. alert, oriented x 3  no dementia  GEN: Well developed, well nourished 25 y.o. male in no acute distress.    SURGICAL DRESSINGS: No soilage present  TENDERNESS:   No incisional tenderness   SKIN INCISION: Incision looks good, No erythema and no Drainage, healed;  NEUROVASCULAR: is grossly intact. Positive distal pulses and capillary refill. DVT ASSESSMENT: No evidence of DVT seen on physical exam.  ROM: Improved ROM    IMPRESSION:       Encounter Diagnoses     ICD-10-CM ICD-9-CM   1. Achilles tendon rupture, right, subsequent encounter S86.011D V58.89     845.09   2. Post-operative state Z98.890 V45.89         PLAN:         No orders of the defined types were placed in this encounter.              Continue activity modification  Continue PT as ordered  He may return to work on light duty (no running, climbing, crawling, squatting, jumping or heavy lifting. The patient is instructed to follow up 4 weeks. They were advised to contact us if their condition worsens.             Patient has been discussed with Dr. Shea Leonard during this visit and he agrees with the assessment and plan. Patient understands treatment plan and has been provided with patient education. PAST MEDICAL HISTORY:     Past Medical History:   Diagnosis Date    Orthodontics braces       MEDICATIONS:     Current Outpatient Prescriptions   Medication Sig    HYDROcodone-acetaminophen (NORCO) 5-325 mg per tablet     oxyCODONE-acetaminophen (PERCOCET) 7.5-325 mg per tablet TAKE ONE TO TWO TABLETS BY MOUTH Q 4 - 6 HRS AS NEEDED FOR PAIN **AFTER SURGERY** DO NOT TAKE BEFORE SURGERY  Indications: Pain    aspirin (ASPIRIN) 325 mg tablet Take 1 Tab by mouth daily. **START TAKING AFTER SURGERY**    promethazine (PHENERGAN) 25 mg tablet Take 1 Tab by mouth every six (6) hours as needed for Nausea.  polyethylene glycol (MIRALAX) 17 gram packet Take 1 Packet by mouth daily.  acetaminophen (TYLENOL) 325 mg tablet Take  by mouth every four (4) hours as needed for Pain. No current facility-administered medications for this visit.         ALLERGIES:     No Known Allergies      PAST SURGICAL HISTORY:     Past Surgical History:   Procedure Laterality Date    HAND/FINGER SURGERY UNLISTED      HX ORTHOPAEDIC      finger    HX TONSIL AND ADENOIDECTOMY  2004       SOCIAL HISTORY:     Social History     Social History    Marital status: SINGLE     Spouse name: N/A    Number of children: N/A    Years of education: N/A     Occupational History    Not on file. Social History Main Topics    Smoking status: Never Smoker    Smokeless tobacco: Never Used    Alcohol use Yes      Comment: ocassional     Drug use: Yes     Special: Marijuana      Comment: last use was 3/10/17    Sexual activity: Not on file     Other Topics Concern    Not on file     Social History Narrative       FAMILY HISTORY:     Family History   Problem Relation Age of Onset    Diabetes Maternal Grandmother          REVIEW OF SYSTEMS:     Otherwise as noted in HPI      RADIOGRAPHS & DIAGNOSTIC STUDIES     No results found for any visits on 07/25/17.           Theo Perez PA-C  7/25/2017

## 2017-07-25 NOTE — LETTER
NOTIFICATION RETURN TO WORK / SCHOOL 
 
7/25/2017 11:37 AM 
 
Mr. Cathie Mcgrath [address] [address] To Whom It May Concern: Cathie Mcgarth is currently under the care of [facility] [place]. 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 He will be re-evaluated in 4 weeks. If there are questions or concerns please have the patient contact our office.  
 
 
 
Sincerely, 
 
 
Caroline Stringer MD

## 2017-07-25 NOTE — PATIENT INSTRUCTIONS
The patient is instructed to follow up 4 weeks. They were advised to contact us if their condition worsens. Achilles Tendon Tear: Care Instructions  Your Care Instructions    You have ruptured or torn your Achilles tendon. The Achilles tendon (also called the heel cord) connects the calf muscles on the back of the lower leg to the bone at the base of the heel. Treatment for an Achilles tendon injury depends on whether the tendon has been partially torn or completely ruptured. A cast or splint can often treat a partial tear. If your tendon has ruptured, you may need surgery. You and your orthopedic doctor will choose a treatment plan, so it is important to go to any follow-up appointments. Follow-up care is a key part of your treatment and safety. Be sure to make and go to all appointments, and call your doctor if you are having problems. Its also a good idea to know your test results and keep a list of the medicines you take. How can you care for yourself at home? · Prop up the sore foot on a pillow anytime you sit or lie down during the next 3 days. Try to keep it above the level of your heart. This will help reduce swelling. · Take pain medicines exactly as directed. ¨ If the doctor gave you a prescription medicine for pain, take it as prescribed. ¨ If you are not taking a prescription pain medicine, ask your doctor if you can take an over-the-counter medicine. · Do not put weight on the affected foot until your doctor says you can. Use crutches or a walker. · Wear the splint or cast as directed until your doctor says you can remove it. When should you call for help? Call 911 anytime you think you may need emergency care. For example, call if:  · You have sudden chest pain and shortness of breath, or you cough up blood. Call your doctor now or seek immediate medical care if:  · You have increased or severe pain. · Your foot is cool or pale or changes color.   · You have tingling, weakness, or numbness in your toes. · Your cast or splint feels too tight. · You cannot move your toes. · You have a fever, or there is drainage or a bad smell coming from the cast.  · You have signs of a blood clot, such as:  ¨ Pain in your calf, back of the knee, thigh, or groin. ¨ Redness or swelling in your leg. · The skin under your cast or splint burns or stings. Watch closely for changes in your health, and be sure to contact your doctor if:  · You do not get better as expected. Where can you learn more? Go to http://linh-nuha.info/. Enter F495 in the search box to learn more about \"Achilles Tendon Tear: Care Instructions. \"  Current as of: May 23, 2016  Content Version: 11.3  © 7838-9890 [employer]. Care instructions adapted under license by [employer] (which disclaims liability or warranty for this information). If you have questions about a medical condition or this instruction, always ask your healthcare professional. [address] any warranty or liability for your use of this information.

## 2017-07-25 NOTE — PROGRESS NOTES
AMBULATORY PROGRESS NOTE      Patient: Bland Mcburney             MRN: [id number]     SSN: [id number] There is no height or weight on file to calculate BMI. YOB: 1995     AGE: 25 y.o. EX: male    PCP: Elyssa Bryan DO    IMPRESSION/DIAGNOSIS AND TREATMENT PLAN     DIAGNOSES  No diagnosis found. No orders of the defined types were placed in this encounter. Bland Mcburney understands his diagnoses and the proposed plan. Plan:    1)  2)    RTO -     HPI AND EXAMINATION     Chris Martinez IS A 25 y.o. male who presents to my outpatient office for follow up of a S/P Achilles tendon repair for a subsequent Achilles tendon repair. At last visit, I ordered formal PT WB as tolerated without single stance raises. The patient presents to the office today    RIGHT ANKLE:  APPEARANCE: alert, well appearing, and in no distress. PSYCH: Normal affect, mood, and conduct. alert, oriented x 3  no dementia  GEN: Well developed, well nourished 25 y.o. male in no acute distress. SURGICAL DRESSINGS: No soilage present  TENDERNESS:   No incisional tenderness   SKIN INCISION: Incision looks good (approximately 8 cm length), No erythema and No Drainage, healed;  NEUROVASCULAR: is grossly intact. Positive distal pulses and capillary refill. DVT ASSESSMENT: No evidence of DVT seen on physical exam.  ROM: Improved ROM    CHART REVIEW     Past Medical History:   Diagnosis Date    Orthodontics braces     Current Outpatient Prescriptions   Medication Sig    HYDROcodone-acetaminophen (NORCO) 5-325 mg per tablet     oxyCODONE-acetaminophen (PERCOCET) 7.5-325 mg per tablet TAKE ONE TO TWO TABLETS BY MOUTH Q 4 - 6 HRS AS NEEDED FOR PAIN **AFTER SURGERY** DO NOT TAKE BEFORE SURGERY  Indications: Pain    aspirin (ASPIRIN) 325 mg tablet Take 1 Tab by mouth daily. **START TAKING AFTER SURGERY**    promethazine (PHENERGAN) 25 mg tablet Take 1 Tab by mouth every six (6) hours as needed for Nausea.     polyethylene glycol (MIRALAX) 17 gram packet Take 1 Packet by mouth daily.  acetaminophen (TYLENOL) 325 mg tablet Take  by mouth every four (4) hours as needed for Pain. No current facility-administered medications for this visit. No Known Allergies  Past Surgical History:   Procedure Laterality Date    HAND/FINGER SURGERY UNLISTED      HX ORTHOPAEDIC      finger    HX TONSIL AND ADENOIDECTOMY  2004     Social History     Occupational History    Not on file. Social History Main Topics    Smoking status: Never Smoker    Smokeless tobacco: Never Used    Alcohol use Yes      Comment: ocassional     Drug use: Yes     Special: Marijuana      Comment: last use was 3/10/17    Sexual activity: Not on file     Family History   Problem Relation Age of Onset    Diabetes Maternal Grandmother        REVIEW OF SYSTEMS : 7/25/2017  ALL BELOW ARE Negative except : SEE HPI       Constitutional: Negative for fever, chills and weight loss. Neg Weigh Loss  Cardiovascular: Negative for chest pain, claudication and leg swelling. SOB, CARRASCO   Gastrointestinal: Negative for  pain, N/V/D/C, Blood in stool or urine,dysuria, hematuria,        Incontinence, pelvic pain  Musculoskeletal: see HPI. Neurological: Negative for dizziness and weakness. Negative for headaches,Visual Changes, Confusion, Seizures,   Psychiatric/Behavioral: Negative for depression, memory loss and substance abuse. Extremities:  Negative for  hair changes, rash or skin lesion changes. Hematologic: Negative for Bleeding problems, bruising, pallor or swollen lymph nodes. Peripheral Vascular: No calf pain, vascular vein tenderness to calf pain              No calf throbbing, posterior knee throbbing pain    DIAGNOSTIC IMAGING     No notes on file    Written by Evelyne Felty, as dictated by Gemini Pollock MD. IDr., Gemini Pollock MD, confirm that all documentation is accurate.

## 2017-07-25 NOTE — MR AVS SNAPSHOT
Visit Information Date & Time Provider Department Dept. Phone Encounter #  
 7/25/2017 10:50 AM George New MD [facility] [place] [place] [facility] [phone] Follow-up Instructions Return in about 4 weeks (around 8/22/2017) for follow up evaluation. Follow-up and Disposition History Your Appointments 8/22/2017  8:45 AM  
Follow Up with Isidra Arciniega PA-C  
[facility] ([address] [facility]) Appt Note: 1 mo f/u  
 [address], [address] 200 [facility]  
[phone] [address] [place] [address] Upcoming Health Maintenance Date Due Hepatitis A Peds Age 1-18 (1 of 2 - Standard Series) 10/2/1998 DTaP/Tdap/Td series (1 - Tdap) 10/2/2004 HPV AGE 9Y-34Y (1 of 3 - Male 3 Dose Series) 10/2/2008 INFLUENZA AGE 9 TO ADULT 8/1/2017 Allergies as of 7/25/2017  Review Complete On: 7/25/2017 By: Isidra Arciniega PA-C No Known Allergies Current Immunizations  Never Reviewed No immunizations on file. Not reviewed this visit You Were Diagnosed With   
  
 Codes Comments Achilles tendon rupture, right, subsequent encounter    -  Primary ICD-10-CM: S86.011D ICD-9-CM: V58.89, 845.09 Post-operative state     ICD-10-CM: Z27.751 ICD-9-CM: V45.89 Vitals BP Pulse Temp Resp Height(growth percentile) Weight(growth percentile) 128/76 (58 %/ 34 %)* 67 97.6 °F (36.4 °C) (Oral) 16 6' 2\" (1.88 m) (94 %, Z= 1.57) 174 lb 3.2 oz (79 kg) (75 %, Z= 0.68) SpO2 BMI Smoking Status 100% 22.37 kg/m2 (43 %, Z= -0.19) Never Smoker *BP percentiles are based on NHBPEP's 4th Report Growth percentiles are based on CDC 2-20 Years data. BMI and BSA Data Body Mass Index Body Surface Area  
 22.37 kg/m 2 2.03 m 2 Your Updated Medication List  
  
   
 This list is accurate as of: 7/25/17 12:22 PM.  Always use your most recent med list.  
  
  
  
  
 aspirin 325 mg tablet Commonly known as:  ASPIRIN Take 1 Tab by mouth daily. **START TAKING AFTER SURGERY** HYDROcodone-acetaminophen 5-325 mg per tablet Commonly known as:  NORCO  
  
 oxyCODONE-acetaminophen 7.5-325 mg per tablet Commonly known as:  PERCOCET TAKE ONE TO TWO TABLETS BY MOUTH Q 4 - 6 HRS AS NEEDED FOR PAIN **AFTER SURGERY** DO NOT TAKE BEFORE SURGERY  Indications: Pain  
  
 polyethylene glycol 17 gram packet Commonly known as:  Manual Rouleau Take 1 Packet by mouth daily. promethazine 25 mg tablet Commonly known as:  PHENERGAN Take 1 Tab by mouth every six (6) hours as needed for Nausea. TYLENOL 325 mg tablet Generic drug:  acetaminophen Take  by mouth every four (4) hours as needed for Pain. Follow-up Instructions Return in about 4 weeks (around 8/22/2017) for follow up evaluation. To-Do List   
 07/25/2017 1:30 PM  
  Appointment with Linda Vick PT at [address] [place] [address] ([phone])  
  
 07/27/2017 9:00 AM  
  Appointment with Jacqueline Christianson PT at [facility] ([phone]) Patient Instructions The patient is instructed to follow up 4 weeks. They were advised to contact us if their condition worsens. Achilles Tendon Tear: Care Instructions Your Care Instructions You have ruptured or torn your Achilles tendon. The Achilles tendon (also called the heel cord) connects the calf muscles on the back of the lower leg to the bone at the base of the heel. Treatment for an Achilles tendon injury depends on whether the tendon has been partially torn or completely ruptured. A cast or splint can often treat a partial tear. If your tendon has ruptured, you may need surgery. You and your orthopedic doctor will choose a treatment plan, so it is important to go to any follow-up appointments. Follow-up care is a key part of your treatment and safety. Be sure to make and go to all appointments, and call your doctor if you are having problems. Its also a good idea to know your test results and keep a list of the medicines you take. How can you care for yourself at home? · Prop up the sore foot on a pillow anytime you sit or lie down during the next 3 days. Try to keep it above the level of your heart. This will help reduce swelling. · Take pain medicines exactly as directed. ¨ If the doctor gave you a prescription medicine for pain, take it as prescribed. ¨ If you are not taking a prescription pain medicine, ask your doctor if you can take an over-the-counter medicine. · Do not put weight on the affected foot until your doctor says you can. Use crutches or a walker. · Wear the splint or cast as directed until your doctor says you can remove it. When should you call for help? Call 911 anytime you think you may need emergency care. For example, call if: 
· You have sudden chest pain and shortness of breath, or you cough up blood. Call your doctor now or seek immediate medical care if: 
· You have increased or severe pain. · Your foot is cool or pale or changes color. · You have tingling, weakness, or numbness in your toes. · Your cast or splint feels too tight. · You cannot move your toes. · You have a fever, or there is drainage or a bad smell coming from the cast. 
· You have signs of a blood clot, such as: 
¨ Pain in your calf, back of the knee, thigh, or groin. ¨ Redness or swelling in your leg. · The skin under your cast or splint burns or stings. Watch closely for changes in your health, and be sure to contact your doctor if: 
· You do not get better as expected. Where can you learn more? Go to http://linh-nuha.info/. Enter F495 in the search box to learn more about \"Achilles Tendon Tear: Care Instructions. \" Current as of: May 23, 2016 Content Version: 11.3 © 0479-3124 [employer], [employer]. Care instructions adapted under license by [employer] (which disclaims liability or warranty for this information). If you have questions about a medical condition or this instruction, always ask your healthcare professional. [address] any warranty or liability for your use of this information. Introducing [facility]! [facility] introduces Cloudmeter patient portal. Now you can access parts of your medical record, email your doctor's office, and request medication refills online. 1. In your internet browser, go to [email or web address]. [email or web address] 2. Click on the First Time User? Click Here link in the Sign In box. You will see the New Member Sign Up page. 3. Enter your Cloudmeter Access Code exactly as it appears below. You will not need to use this code after youve completed the sign-up process. If you do not sign up before the expiration date, you must request a new code. · Cloudmeter Access Code: [email or web address] Expires: 10/11/2017  2:43 PM 
 
4. Enter the last four digits of your Social Security Number (xxxx) and Date of Birth (mm/dd/yyyy) as indicated and click Submit. You will be taken to the next sign-up page. 5. Create a Cloudmeter ID. This will be your Cloudmeter login ID and cannot be changed, so think of one that is secure and easy to remember. 6. Create a Cloudmeter password. You can change your password at any time. 7. Enter your Password Reset Question and Answer. This can be used at a later time if you forget your password. 8. Enter your e-mail address. You will receive e-mail notification when new information is available in [address] 9. Click Sign Up. You can now view and download portions of your medical record. 10. Click the Download Summary menu link to download a portable copy of your medical information. If you have questions, please visit the Frequently Asked Questions section of the [employer] website. Remember, [employer] is NOT to be used for urgent needs. For medical emergencies, dial 911. Now available from your iPhone and Android! Please provide this summary of care documentation to your next provider. Your primary care clinician is listed as Yamini Ray. If you have any questions after today's visit, please call [phone].

## 2017-07-27 ENCOUNTER — HOSPITAL ENCOUNTER (OUTPATIENT)
Dept: PHYSICAL THERAPY | Age: 22
Discharge: HOME | End: 2017-07-27
Payer: COMMERCIAL

## 2017-07-27 PROCEDURE — 97110 THERAPEUTIC EXERCISES: CPT

## 2017-07-27 PROCEDURE — 97140 MANUAL THERAPY 1/> REGIONS: CPT

## 2017-07-27 NOTE — PROGRESS NOTES
PT DAILY TREATMENT NOTE     Patient Name: Florinda Galeano  Date:2017  : 1995  [x]  Patient  Verified  Payor: BLUE CROSS / Plan: [employer] [facility] / Product Type: PPO /    In time:914  Out time:1015  Total Treatment Time (min): 43  Visit #: 2 of 10    Treatment Area: Pain in right ankle and joints of right foot [M25.571]    SUBJECTIVE  Pain Level (0-10 scale):0  Any medication changes, allergies to medications, adverse drug reactions, diagnosis change, or new procedure performed?: [x] No    [] Yes (see summary sheet for update)  Subjective functional status/changes:   [] No changes reported  I am not having any pain.     OBJECTIVE    Modality rationale: post exercise recovery to improve the patients ability to aid with increase tolerance to ADLs   Min Type Additional Details    [] Estim:  []Unatt       []IFC  []Premod                        []Other:  []w/ice   []w/heat  Position:  Location:    [] Estim: []Att    []TENS instruct  []NMES                    []Other:  []w/US   []w/ice   []w/heat  Position:  Location:    []  Traction: [] Cervical       []Lumbar                       [] Prone          []Supine                       []Intermittent   []Continuous Lbs:  [] before manual  [] after manual    []  Ultrasound: []Continuous   [] Pulsed                           []1MHz   []3MHz W/cm2:  Location:    []  Iontophoresis with dexamethasone         Location: [] Take home patch   [] In clinic    []  Ice     []  heat  []  Ice massage  []  Laser   []  Anodyne Position:  Location:    []  Laser with stim  []  Other:  Position:  Location:    []  Vasopneumatic Device Pressure:       [] lo [] med [] hi   Temperature: [] lo [] med [] hi   [] Skin assessment post-treatment:  []intact []redness- no adverse reaction    []redness  adverse reaction:      min []Eval                  []Re-Eval       30 min Therapeutic Exercise:  [x] See flow sheet :   Rationale: increase ROM, increase strength and improve coordination to improve the patients ability to aid with increase tolerance to ADLs and activities     min Therapeutic Activity:  []  See flow sheet :   Rationale:   to improve the patients ability to       min Neuromuscular Re-education:  []  See flow sheet :   Rationale:   to improve the patients ability to     13 min Manual Therapy:  PROM, effleurage gentle joint mobs   Rationale: increase ROM and increase tissue extensibility to aid with increase tolerance to ADLS and activities     min Gait Training:  ___ feet with ___ device on level surfaces with ___ level of assist   Rationale: With   [] TE   [] TA   [] neuro   [] other: Patient Education: [x] Review HEP    [] Progressed/Changed HEP based on:   [] positioning   [] body mechanics   [] transfers   [] heat/ice application    [] other:      Other Objective/Functional Measures: VC exercises and technique    Pain Level (0-10 scale) post treatment: 0    ASSESSMENT/Changes in Function: tolerated well including addition of new exercises    Patient will continue to benefit from skilled PT services to modify and progress therapeutic interventions, address functional mobility deficits, address ROM deficits, address strength deficits, analyze and address soft tissue restrictions, analyze and cue movement patterns, analyze and modify body mechanics/ergonomics, assess and modify postural abnormalities and instruct in home and community integration to attain remaining goals. [x]  See Plan of Care  []  See progress note/recertification  []  See Discharge Summary            Progress towards goals / Updated goals:   1. Pt will Amb on TM 1/2mile w/o increased s/s  PN TM . 19 miles at 2.5 MPH  5 minutes  7/20/17  Current Status:   1/2 mile 7/25/17  2.  Pt will have (R) foot AROM DF to 5* or better for good ankle mobility for a normal gait pattern  PN Progressing at +3  7/20/17  Current Status:     PLAN  [x]  Upgrade activities as tolerated     [x]  Continue plan of care  []  Update interventions per flow sheet       []  Discharge due to:_  []  Other:_      Royal Owen, PT 7/27/2017  9:15 AM    Future Appointments  Date Time Provider Jailyn Fernando   8/22/2017 8:45 AM RENETTA Michelle

## 2017-08-09 ENCOUNTER — HOSPITAL ENCOUNTER (OUTPATIENT)
Dept: PHYSICAL THERAPY | Age: 22
Discharge: HOME | End: 2017-08-09
Payer: COMMERCIAL

## 2017-08-09 PROCEDURE — 97110 THERAPEUTIC EXERCISES: CPT

## 2017-08-09 PROCEDURE — 97140 MANUAL THERAPY 1/> REGIONS: CPT

## 2017-08-09 NOTE — PROGRESS NOTES
PT DAILY TREATMENT NOTE     Patient Name: Maria R Aguilar  Date:2017  : 1995  [x]  Patient  Verified  Payor: BLUE CROSS / Plan: [employer] [facility] / Product Type: PPO /    In time:843  Out time:947  Total Treatment Time (min): 55  Visit #: 3 of 10    Treatment Area: Pain in right ankle and joints of right foot [M25.571]    SUBJECTIVE  Pain Level (0-10 scale): 0  Any medication changes, allergies to medications, adverse drug reactions, diagnosis change, or new procedure performed?: [x] No    [] Yes (see summary sheet for update)  Subjective functional status/changes:   [] No changes reported  Doing good, it is doing good    OBJECTIVE    Modality rationale: increase tissue extensibility to improve the patients ability to aid with post exercise recovery.     Min Type Additional Details    [] Estim:  []Unatt       []IFC  []Premod                        []Other:  []w/ice   []w/heat  Position:  Location:    [] Estim: []Att    []TENS instruct  []NMES                    []Other:  []w/US   []w/ice   []w/heat  Position:  Location:    []  Traction: [] Cervical       []Lumbar                       [] Prone          []Supine                       []Intermittent   []Continuous Lbs:  [] before manual  [] after manual    []  Ultrasound: []Continuous   [] Pulsed                           []1MHz   []3MHz W/cm2:  Location:    []  Iontophoresis with dexamethasone         Location: [] Take home patch   [] In clinic   10 [x]  Ice  post   []  heat  []  Ice massage  []  Laser   []  Anodyne Position:reclined  Location:R ankle    []  Laser with stim  []  Other:  Position:  Location:    []  Vasopneumatic Device Pressure:       [] lo [] med [] hi   Temperature: [] lo [] med [] hi   [] Skin assessment post-treatment:  []intact []redness- no adverse reaction    []redness  adverse reaction:       min []Eval                  []Re-Eval       32 min Therapeutic Exercise:  [] See flow sheet :   Rationale: increase ROM, increase strength, improve coordination, improve balance and increase proprioception to improve the patients ability to aid with increase tolerance to ADLs and activities     min Therapeutic Activity:  []  See flow sheet :   Rationale:   to improve the patients ability to       min Neuromuscular Re-education:  []  See flow sheet :   Rationale:   to improve the patients ability to     13 min Manual Therapy:  PROM,   gentle joint mobs   Rationale: increase ROM and increase tissue extensibility to aid with increase tolerance to ADLs and activities     min Gait Training:  ___ feet with ___ device on level surfaces with ___ level of assist   Rationale: With   [] TE   [] TA   [] neuro   [] other: Patient Education: [x] Review HEP    [] Progressed/Changed HEP based on:   [] positioning   [] body mechanics   [] transfers   [] heat/ice application    [] other:      Other Objective/Functional Measures: VC exercises and technique    Pain Level (0-10 scale) post treatment: 0    ASSESSMENT/Changes in Function: tolerated well. Patient will continue to benefit from skilled PT services to modify and progress therapeutic interventions, address functional mobility deficits, address ROM deficits, address strength deficits, analyze and address soft tissue restrictions, analyze and cue movement patterns, analyze and modify body mechanics/ergonomics, assess and modify postural abnormalities and instruct in home and community integration to attain remaining goals. [x]  See Plan of Care  [x]  See progress note/recertification  []  See Discharge Summary         Progress towards goals / Updated goals:  1. Pt will Amb on TM 1/2mile w/o increased s/s  PN TM . 19 miles at 2.5 MPH  5 minutes  7/20/17  Current Status:   1/2 mile 8/8/17 10 minutes  2.  Pt will have (R) foot AROM DF to 5* or better for good ankle mobility for a normal gait pattern  PN Progressing at +3  7/20/17  Current Status:        PLAN  [x]  Upgrade activities as tolerated     [x]  Continue plan of care  []  Update interventions per flow sheet       []  Discharge due to:_  []  Other:_      Abdi Hodgson PT 8/9/2017  8:58 AM    Future Appointments  Date Time Provider Jailyn Fernando   8/9/2017 9:00 AM Abdi Hodgson PT [facility]   8/11/2017 12:00 PM Maggie Oconnor PTA [facility]   8/22/2017 8:45 AM RENETTA Segal [address]

## 2017-08-11 ENCOUNTER — HOSPITAL ENCOUNTER (OUTPATIENT)
Dept: PHYSICAL THERAPY | Age: 22
Discharge: HOME | End: 2017-08-11
Payer: COMMERCIAL

## 2017-08-11 PROCEDURE — 97140 MANUAL THERAPY 1/> REGIONS: CPT

## 2017-08-11 PROCEDURE — 97110 THERAPEUTIC EXERCISES: CPT

## 2017-08-11 NOTE — PROGRESS NOTES
PT DAILY TREATMENT NOTE - [facility]     Patient Name: Yossi Jones  Date:2017  : 1995  [x]  Patient  Verified  Payor: BLUE CROSS / Plan: [email or web address] [facility] / Product Type: PPO /    In time:11:55  Out time:12:40  Total Treatment Time (min):  43  Visit #: 4 of 10    Treatment Area: Pain in right ankle and joints of right foot [M25.571]    SUBJECTIVE  Pain Level (0-10 scale): 0  Any medication changes, allergies to medications, adverse drug reactions, diagnosis change, or new procedure performed?: [x] No    [] Yes (see summary sheet for update)  Subjective functional status/changes:   [] No changes reported  No pain.     OBJECTIVE    Modality rationale: decrease edema, decrease inflammation, decrease pain and increase tissue extensibility to improve the patients ability to perform ADL    Min Type Additional Details    [] Estim:  []Unatt       []IFC  []Premod                        []Other:  []w/ice   []w/heat  Position:  Location:    [] Estim: []Att    []TENS instruct  []NMES                    []Other:  []w/US   []w/ice   []w/heat  Position:  Location:    []  Traction: [] Cervical       []Lumbar                       [] Prone          []Supine                       []Intermittent   []Continuous Lbs:  [] before manual  [] after manual    []  Ultrasound: []Continuous   [] Pulsed                           []1MHz   []3MHz W/cm2:  Location:    []  Iontophoresis with dexamethasone         Location: [] Take home patch   [] In clinic    []  Ice     []  heat  []  Ice massage  []  Laser   []  Anodyne Position:  Location:    []  Laser with stim  []  Other:  Position:  Location:    []  Vasopneumatic Device Pressure:       [] lo [] med [] hi   Temperature: [] lo [] med [] hi   [x] Skin assessment post-treatment:  [x]intact []redness- no adverse reaction    []redness  adverse reaction:      min []Eval                  []Re-Eval       25 min Therapeutic Exercise:  [x] See flow sheet :   Rationale: increase ROM and increase strength to improve the patients ability to perform ADL    10 min Therapeutic Activity:  []  See flow sheet :   Rationale:   to improve the patients ability to       min Neuromuscular Re-education:  []  See flow sheet :   Rationale:   to improve the patients ability to     8 min Manual Therapy: Ankle  JT mob   Rationale: decrease pain, increase ROM, increase tissue extensibility and decrease edema  to perform ADL      min Gait Training:  ___ feet with ___ device on level surfaces with ___ level of assist   Rationale: With   [x] TE   [] TA   [] neuro   [] other: Patient Education: [x] Review HEP    [] Progressed/Changed HEP based on:   [] positioning   [] body mechanics   [] transfers   [] heat/ice application    [] other:      Other Objective/Functional Measures:  Fair  Response  To each there ex./added  Black  theraband    Pain Level (0-10 scale) post treatment: 0    ASSESSMENT/Changes in Function: Minimal  Tightness  (R) ankle    Patient will continue to benefit from skilled PT services to address functional mobility deficits, address ROM deficits, address strength deficits, analyze and address soft tissue restrictions, analyze and cue movement patterns and instruct in home and community integration to attain remaining goals. [x]  See Plan of Care  []  See progress note/recertification  []  See Discharge Summary         Progress towards goals / Updated goals:  1. Pt will Amb on TM 1/2mile w/o increased s/s  PN TM . 19 miles at 2.5 MPH  5 minutes  7/20/17  Current Status:   1/2 mile 8/8/17 10 minutes  2.  Pt will have (R) foot AROM DF to 5* or better for good ankle mobility for a normal gait pattern  PN Progressing at +3  7/20/17  Current Status:     PLAN  []  Upgrade activities as tolerated     [x]  Continue plan of care  []  Update interventions per flow sheet       []  Discharge due to:_  []  Other:_      Maggie Negron, PTA 8/11/2017  12:28 PM    Future Appointments  Date Time Provider Jailyn Fernando   8/16/2017 10:30 AM Sarah Adkins, PTA [facility]   8/18/2017 8:30 AM Maggie Negron, PTA [facility]   8/21/2017 9:00 AM Maggie Negron, PTA [facility]   8/22/2017 8:45 AM RENETTA Young [facility]   8/23/2017 9:00 AM Maggie Negron, PTA [facility]   8/28/2017 9:00 AM Maggie Negron, PTA [facility]   8/30/2017 9:00 AM Deepak Yarbrough, PT [facility]

## 2017-08-16 ENCOUNTER — HOSPITAL ENCOUNTER (OUTPATIENT)
Dept: PHYSICAL THERAPY | Age: 22
Discharge: HOME | End: 2017-08-16
Payer: COMMERCIAL

## 2017-08-16 PROCEDURE — 97530 THERAPEUTIC ACTIVITIES: CPT

## 2017-08-16 PROCEDURE — 97110 THERAPEUTIC EXERCISES: CPT

## 2017-08-16 NOTE — PROGRESS NOTES
PT DAILY TREATMENT NOTE - [facility]     Patient Name: Marie Schaefer  Date:2017  : 1995  [x]  Patient  Verified  Payor: BLUE CROSS / Plan: [place] [facility] / Product Type: PPO /    In time: 10:37  Out time:11:22  Total Treatment Time (min): 38   Visit #: 5 of 10    Treatment Area: Pain in right ankle and joints of right foot [M25.571]    SUBJECTIVE  Pain Level (0-10 scale): 0  Any medication changes, allergies to medications, adverse drug reactions, diagnosis change, or new procedure performed?: [x] No    [] Yes (see summary sheet for update)  Subjective functional status/changes:   [] No changes reported  No pain.     OBJECTIVE    Modality rationale: decrease edema, decrease inflammation, decrease pain and increase tissue extensibility to improve the patients ability to perform ADL    Min Type Additional Details    [] Estim:  []Unatt       []IFC  []Premod                        []Other:  []w/ice   []w/heat  Position:  Location:    [] Estim: []Att    []TENS instruct  []NMES                    []Other:  []w/US   []w/ice   []w/heat  Position:  Location:    []  Traction: [] Cervical       []Lumbar                       [] Prone          []Supine                       []Intermittent   []Continuous Lbs:  [] before manual  [] after manual    []  Ultrasound: []Continuous   [] Pulsed                           []1MHz   []3MHz W/cm2:  Location:    []  Iontophoresis with dexamethasone         Location: [] Take home patch   [] In clinic   10 [x]  Ice post    []  heat  []  Ice massage  []  Laser   []  Anodyne Position: long sit  Location:(R) ankle    []  Laser with stim  []  Other:  Position:  Location:    []  Vasopneumatic Device Pressure:       [] lo [] med [] hi   Temperature: [] lo [] med [] hi   [x] Skin assessment post-treatment:  [x]intact []redness- no adverse reaction    []redness  adverse reaction:      min []Eval                  []Re-Eval       18 min Therapeutic Exercise:  [x] See flow sheet : Rationale: increase ROM and increase strength to improve the patients ability to perform ADL     10 min Therapeutic Activity:  [x]  See flow sheet :   Rationale: increase ROM and increase strength  to improve the patients ability to perform ADL       min Neuromuscular Re-education:  []  See flow sheet :   Rationale:   to improve the patients ability to      min Manual Therapy:     Rationale: decrease pain, increase ROM, increase tissue extensibility and decrease edema  to perform ADL      min Gait Training:  ___ feet with ___ device on level surfaces with ___ level of assist   Rationale: With   [x] TE   [] TA   [] neuro   [] other: Patient Education: [x] Review HEP    [] Progressed/Changed HEP based on:   [] positioning   [] body mechanics   [] transfers   [] heat/ice application    [] other:      Other Objective/Functional Measures: FOTO:  73    Pain Level (0-10 scale) post treatment: 0    ASSESSMENT/Changes in Function:  FOTO has improved  From  28 to 73. Ran  On  TM  . [de-identified]  Miles  No difficulty    Patient will continue to benefit from skilled PT services to address functional mobility deficits, address ROM deficits, address strength deficits, analyze and address soft tissue restrictions and instruct in home and community integration to attain remaining goals. [x]  See Plan of Care  []  See progress note/recertification  []  See Discharge Summary         Progress towards goals / Updated goals:  1. Pt will Amb on TM 1/2mile w/o increased s/s  PN TM . 19 miles at 2.5 MPH  5 minutes  7/20/17    Ran  . 80 miles  Met  8/16/17  Current Status:   1/2 mile 8/8/17 10 minutes  2.  Pt will have (R) foot AROM DF to 5* or better for good ankle mobility for a normal gait pattern  PN Progressing at +3  7/20/17  Current Status:     PLAN  []  Upgrade activities as tolerated     [x]  Continue plan of care  []  Update interventions per flow sheet       []  Discharge due to:_  []  Other:_      [employer], JOCELYN 8/16/2017  10:48 AM    Future Appointments  Date Time Provider Jailyn Riverai   8/18/2017 8:30 AM Dian Still PTA [facility]   8/21/2017 9:00 AM Dina Still PTA [facility]   8/22/2017 8:45 AM RENETTA Michelle Ad[address]is 71   8/23/2017 9:00 AM Maggie Negron PTA [facility]   8/28/2017 9:00 AM Maggie Negron PTA [facility]   8/30/2017 9:00 AM Royal Owen PT [facility]

## 2017-08-18 ENCOUNTER — HOSPITAL ENCOUNTER (OUTPATIENT)
Dept: PHYSICAL THERAPY | Age: 22
Discharge: HOME | End: 2017-08-18
Payer: COMMERCIAL

## 2017-08-18 PROCEDURE — 97530 THERAPEUTIC ACTIVITIES: CPT

## 2017-08-18 PROCEDURE — 97110 THERAPEUTIC EXERCISES: CPT

## 2017-08-18 NOTE — PROGRESS NOTES
PT DAILY TREATMENT NOTE - [facility]     Patient Name: Heber Perry  Date:2017  : 1995  [x]  Patient  Verified  Payor: BLUE CROSS / Plan: [employer] [facility] / Product Type: PPO /    In time:8:33  Out time:9:26  Total Treatment Time (min): 46  Visit #: 6 of 10    Treatment Area: Pain in right ankle and joints of right foot [M25.571]    SUBJECTIVE  Pain Level (0-10 scale): 0  Any medication changes, allergies to medications, adverse drug reactions, diagnosis change, or new procedure performed?: [x] No    [] Yes (see summary sheet for update)  Subjective functional status/changes:   [] No changes reported  No pain.     OBJECTIVE    Modality rationale: decrease edema, decrease inflammation, decrease pain and increase tissue extensibility to improve the patients ability to perform ADL    Min Type Additional Details    [] Estim:  []Unatt       []IFC  []Premod                        []Other:  []w/ice   []w/heat  Position:  Location:    [] Estim: []Att    []TENS instruct  []NMES                    []Other:  []w/US   []w/ice   []w/heat  Position:  Location:    []  Traction: [] Cervical       []Lumbar                       [] Prone          []Supine                       []Intermittent   []Continuous Lbs:  [] before manual  [] after manual    []  Ultrasound: []Continuous   [] Pulsed                           []1MHz   []3MHz W/cm2:  Location:    []  Iontophoresis with dexamethasone         Location: [] Take home patch   [] In clinic   10 [x]  Ice   post  []  heat  []  Ice massage  []  Laser   []  Anodyne Position:long  sit  Location:(R)  ankle    []  Laser with stim  []  Other:  Position:  Location:    []  Vasopneumatic Device Pressure:       [] lo [] med [] hi   Temperature: [] lo [] med [] hi   [x] Skin assessment post-treatment:  [x]intact []redness- no adverse reaction    []redness  adverse reaction:      min []Eval                  []Re-Eval       25 min Therapeutic Exercise:  [x] See flow sheet : Rationale: increase ROM and increase strength to improve the patients ability to perform ADL    10 min Therapeutic Activity:  []  See flow sheet :   Rationale:   to improve the patients ability to perform ADL       min Neuromuscular Re-education:  []  See flow sheet :   Rationale:   to improve the patients ability to      min Manual Therapy:     Rationale:  to      min Gait Training:  ___ feet with ___ device on level surfaces with ___ level of assist   Rationale: With   [x] TE   [] TA   [] neuro   [] other: Patient Education: [x] Review HEP    [] Progressed/Changed HEP based on:   [] positioning   [] body mechanics   [] transfers   [] heat/ice application    [] other:      Other Objective/Functional Measures:  Jog  TM  1.02  miles    Pain Level (0-10 scale) post treatment: 0    ASSESSMENT/Changes in Function: Sterling r response  To each there ex. Patient will continue to benefit from skilled PT services to address functional mobility deficits, address ROM deficits, address strength deficits, analyze and address soft tissue restrictions, analyze and cue movement patterns and instruct in home and community integration to attain remaining goals. [x]  See Plan of Care  []  See progress note/recertification  []  See Discharge Summary         Progress towards goals / Updated goals:  Progress towards goals / Updated goals:  1. Pt will Amb on TM 1/2mile w/o increased s/s  PN TM . 19 miles at 2.5 MPH  5 minutes  7/20/17    Ran  . 80 miles  Met  8/16/17  Current Status:   1/2 mile 8/8/17 10 minutes  2.  Pt will have (R) foot AROM DF to 5* or better for good ankle mobility for a normal gait pattern  PN Progressing at +3  7/20/17  Current Status:     PLAN  []  Upgrade activities as tolerated     [x]  Continue plan of care  []  Update interventions per flow sheet       []  Discharge due to:_  [x]  Other:_  REASSESS MMT NV    Crystal Migdalia, PTA 8/18/2017  9:11 AM    Future Appointments  Date Time Provider Jailyn Fernando   8/21/2017 9:00 AM Marie Van, PTA [facility]   8/22/2017 8:45 AM RENETTA Driscoll Adonis 71   8/23/2017 9:00 AM Maggie Negron, PTA [facility]   8/28/2017 9:00 AM Maggie Negron, JOCELYN [facility]   8/30/2017 9:00 AM Robin Fuller, PT [facility]

## 2017-08-21 ENCOUNTER — HOSPITAL ENCOUNTER (OUTPATIENT)
Dept: PHYSICAL THERAPY | Age: 22
Discharge: HOME | End: 2017-08-21
Payer: COMMERCIAL

## 2017-08-21 PROCEDURE — 97530 THERAPEUTIC ACTIVITIES: CPT

## 2017-08-21 PROCEDURE — 97110 THERAPEUTIC EXERCISES: CPT

## 2017-08-21 PROCEDURE — 97140 MANUAL THERAPY 1/> REGIONS: CPT

## 2017-08-21 NOTE — PROGRESS NOTES
PT DAILY TREATMENT NOTE - [facility]     Patient Name: Jonathan Dowd  Date:2017  : 1995  [x]  Patient  Verified  Payor: BLUE CROSS / Plan: [employer] [facility] / Product Type: PPO /    In time:9:00  Out time:9:46  Total Treatment Time (min): 44  Visit #: 7 of 10    Treatment Area: Pain in right ankle and joints of right foot [M25.571]    SUBJECTIVE  Pain Level (0-10 scale): 6  Any medication changes, allergies to medications, adverse drug reactions, diagnosis change, or new procedure performed?: [x] No    [] Yes (see summary sheet for update)  Subjective functional status/changes:   [] No changes reported  My knees  are  Hurting.     OBJECTIVE    Modality rationale: decrease edema, decrease inflammation, decrease pain and increase tissue extensibility to improve the patients ability to perform ADL    Min Type Additional Details    [] Estim:  []Unatt       []IFC  []Premod                        []Other:  []w/ice   []w/heat  Position:  Location:    [] Estim: []Att    []TENS instruct  []NMES                    []Other:  []w/US   []w/ice   []w/heat  Position:  Location:    []  Traction: [] Cervical       []Lumbar                       [] Prone          []Supine                       []Intermittent   []Continuous Lbs:  [] before manual  [] after manual    []  Ultrasound: []Continuous   [] Pulsed                           []1MHz   []3MHz W/cm2:  Location:    []  Iontophoresis with dexamethasone         Location: [] Take home patch   [] In clinic   10 [x]  Ice   post  []  heat  []  Ice massage  []  Laser   []  Anodyne Position:long sit  Location:(R) ankle    []  Laser with stim  []  Other:  Position:  Location:    []  Vasopneumatic Device Pressure:       [] lo [] med [] hi   Temperature: [] lo [] med [] hi   [x] Skin assessment post-treatment:  [x]intact []redness- no adverse reaction    []redness  adverse reaction:      min []Eval                  []Re-Eval       16 min Therapeutic Exercise:  [x] See flow sheet :   Rationale: increase ROM and increase strength to improve the patients ability to perform ADL     10 min Therapeutic Activity:  []  See flow sheet :   Rationale:   to improve the patients ability to       min Neuromuscular Re-education:  []  See flow sheet :   Rationale:   to improve the patients ability to     8 min Manual Therapy: Ankle  Jt mob with distraction   Rationale: decrease pain, increase ROM, increase tissue extensibility and decrease edema  to perform ADL      min Gait Training:  ___ feet with ___ device on level surfaces with ___ level of assist   Rationale: With   [x] TE   [] TA   [] neuro   [] other: Patient Education: [x] Review HEP    [] Progressed/Changed HEP based on:   [] positioning   [] body mechanics   [] transfers   [] heat/ice application    [] other:      Other Objective/Functional Measures: Ankle  DF  +15    Pain Level (0-10 scale) post treatment:     ASSESSMENT/Changes in Function: Improved  In  Ankle DF. Good response  To each there ex. Patient will continue to benefit from skilled PT services to address functional mobility deficits, address ROM deficits, address strength deficits, analyze and address soft tissue restrictions, analyze and cue movement patterns and instruct in home and community integration to attain remaining goals. [x]  See Plan of Care  []  See progress note/recertification  []  See Discharge Summary         Progress towards goals / Updated goals:  1. Pt will Amb on TM 1/2mile w/o increased s/s  PN TM . 19 miles at 2.5 MPH  5 minutes  7/20/17    Ran  .80 miles [facility]  8/16/17  Current Status:   1/2 mile 8/8/17 10 minutes  2.  Pt will have (R) foot AROM DF to 5* or better for good ankle mobility for a normal gait pattern  PN Progressing at +15  8/21/17  Current Status:     PLAN  []  Upgrade activities as tolerated     [x]  Continue plan of care  []  Update interventions per flow sheet       []  Discharge due to:_  []  Other:_ Maggie Oconnor PTA 8/21/2017  9:01 AM    Future Appointments  Date Time Provider Jailyn Riverai   8/22/2017 8:45 AM Marylen Perla, [employer] [address]   8/23/2017 9:00 AM Rose Aponte PTA [facility]   8/28/2017 9:00 AM Maggie Negron PTA [facility]   8/30/2017 9:00 AM Lazara Shane, PT [facility]

## 2017-08-22 ENCOUNTER — OFFICE VISIT (OUTPATIENT)
Dept: ORTHOPEDIC SURGERY | Age: 22
End: 2017-08-22

## 2017-08-22 VITALS
WEIGHT: 177 LBS | BODY MASS INDEX: 22.72 KG/M2 | HEART RATE: 64 BPM | OXYGEN SATURATION: 96 % | TEMPERATURE: 97 F | SYSTOLIC BLOOD PRESSURE: 110 MMHG | HEIGHT: 74 IN | RESPIRATION RATE: 18 BRPM | DIASTOLIC BLOOD PRESSURE: 77 MMHG

## 2017-08-22 DIAGNOSIS — Z98.890 POST-OPERATIVE STATE: ICD-10-CM

## 2017-08-22 DIAGNOSIS — Z98.890 S/P ACHILLES TENDON REPAIR: ICD-10-CM

## 2017-08-22 DIAGNOSIS — S86.011D ACHILLES TENDON RUPTURE, RIGHT, SUBSEQUENT ENCOUNTER: Primary | ICD-10-CM

## 2017-08-22 NOTE — PROGRESS NOTES
Patient is here today for a follow up on his achilles tendon repair. He states he has 3 more PT visits. He denies having \"much pain\"  He states he does live in a 2 story home and is able to ambulate up and down the stairs with out difficulty. Patient states he will start the [employer] at [employer] in September of this year.  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

## 2017-08-22 NOTE — MR AVS SNAPSHOT
Visit Information Date & Time Provider Department Dept. Phone Encounter #  
 8/22/2017  8:45 AM Petrona Deven [place] [facility] [phone] [id number] Follow-up Instructions Return in about 2 months (around 10/22/2017). Upcoming Health Maintenance Date Due Hepatitis A Peds Age 1-18 (1 of 2 - Standard Series) 10/2/1998 DTaP/Tdap/Td series (1 - Tdap) 10/2/2004 HPV AGE 9Y-34Y (1 of 3 - Male 3 Dose Series) 10/2/2008 INFLUENZA AGE 9 TO ADULT 8/1/2017 Allergies as of 8/22/2017  Review Complete On: 8/22/2017 By: Petrona Salazar PA-C No Known Allergies Current Immunizations  Never Reviewed No immunizations on file. Not reviewed this visit Vitals BP Pulse Temp Resp Height(growth percentile) Weight(growth percentile) 110/77 (7 %/ 36 %)* 64 97 °F (36.1 °C) (Oral) 18 6' 2\" (1.88 m) (94 %, Z= 1.57) 177 lb (80.3 kg) (78 %, Z= 0.76) SpO2 BMI Smoking Status 96% 22.73 kg/m2 (47 %, Z= -0.08) Never Smoker *BP percentiles are based on NHBPEP's 4th Report Growth percentiles are based on CDC 2-20 Years data. BMI and BSA Data Body Mass Index Body Surface Area  
 22.73 kg/m 2 2.05 m 2 Your Updated Medication List  
  
   
This list is accurate as of: 8/22/17  8:58 AM.  Always use your most recent med list.  
  
  
  
  
 aspirin 325 mg tablet Commonly known as:  ASPIRIN Take 1 Tab by mouth daily. **START TAKING AFTER SURGERY** HYDROcodone-acetaminophen 5-325 mg per tablet Commonly known as:  NORCO  
  
 oxyCODONE-acetaminophen 7.5-325 mg per tablet Commonly known as:  PERCOCET TAKE ONE TO TWO TABLETS BY MOUTH Q 4 - 6 HRS AS NEEDED FOR PAIN **AFTER SURGERY** DO NOT TAKE BEFORE SURGERY  Indications: Pain  
  
 polyethylene glycol 17 gram packet Commonly known as:  Lawerance Amas Take 1 Packet by mouth daily. promethazine 25 mg tablet Commonly known as:  PHENERGAN  
 Take 1 Tab by mouth every six (6) hours as needed for Nausea. TYLENOL 325 mg tablet Generic drug:  acetaminophen Take  by mouth every four (4) hours as needed for Pain. Follow-up Instructions Return in about 2 months (around 10/22/2017). To-Do List   
 08/23/2017 9:00 AM  
  Appointment with Margarita Gifford PTA at [facility] ([phone])  
  
 08/28/2017 9:00 AM  
  Appointment with Margarita Gifford PTA at [facility] ([phone])  
  
 08/30/2017 9:00 AM  
  Appointment with Kelsea Sue PT at [facility] ([phone]) Patient Instructions Continue with PT Follow up with Dr. Felix Carrasquillo as directed Achilles Tendon: Exercises Your Care Instructions Here are some examples of exercises for your achilles tendon. Start each exercise slowly. Ease off the exercise if you start to have pain. Your doctor or physical therapist will tell you when you can start these exercises and which ones will work best for you. How to do the exercises Toe stretch 1. Sit in a chair, and extend your affected leg so that your heel is on the floor. 2. With your hand, reach down and pull your big toe up and back. Pull toward your ankle and away from the floor. 3. Hold the position for at least 15 to 30 seconds. 4. Repeat 2 to 4 times a session, several times a day. Calf-plantar fascia stretch 1. Sit with your legs extended and knees straight. 2. Place a towel around your foot just under the toes. 3. Hold each end of the towel in each hand, with your hands above your knees. 4. Pull back with the towel so that your foot stretches toward you. 5. Hold the position for at least 15 to 30 seconds. 6. Repeat 2 to 4 times a session, up to 5 sessions a day. Floor stretch 1. Stand about 2 feet from a wall, and place your hands on the wall at about shoulder height.  Or you can stand behind a chair, placing your hands on the back of it for balance. 2. Step back with the leg you want to stretch. Keep the leg straight, and press your heel into the floor with your toe turned slightly in. 
3. Lean forward, and bend your other leg slightly. Feel the stretch in the Achilles tendon of your back leg. Hold for at least 15 to 30 seconds. 4. Repeat 2 to 4 times a session, up to 5 sessions a day. Stair stretch 1. Stand with the balls of both feet on the edge of a step or curb (or a medium-sized phone book). With at least one hand, hold onto something solid for balance, such as a banister or handrail. 2. Keeping your affected leg straight, slowly let that heel hang down off of the step or curb until you feel a stretch in the back of your calf and/or Achilles area. Some of your weight should still be on the other leg. 3. Hold this position for at least 15 to 30 seconds. 4. Repeat 2 to 4 times a session, up to 5 times a day or whenever your Achilles tendon starts to feel tight. This stretch can also be done with your knee slightly bent. Strength exercise This exercise will get you started on building strength after an Achilles tendon injury. Your doctor or physical therapist can help you move on to more challenging exercises as you heal and get stronger. 1. Stand on a step with your heel off the edge of the step. Hold on to a handrail or wall for balance. 2. Push up on your toes, then slowly count to 10 as you lower yourself back down until your heel is below the step. If it hurts to push up on your toes, try putting most of your weight on your other foot as you push up, or try using your arms to help you. If you can't do this exercise without causing pain, stop the exercise and talk to your doctor. 3. Repeat the exercise 8 to 12 times, half with the knee straight and half with the knee bent. Follow-up care is a key part of your treatment and safety.  Be sure to make and go to all appointments, and call your doctor if you are having problems. It's also a good idea to know your test results and keep a list of the medicines you take. Where can you learn more? Go to http://linh-nuha.info/. Enter R360 in the search box to learn more about \"Achilles Tendon: Exercises. \" Current as of: March 21, 2017 Content Version: 11.3 © 9491-7082 [employer]. Care instructions adapted under license by [employer] (which disclaims liability or warranty for this information). If you have questions about a medical condition or this instruction, always ask your healthcare professional. [address] any warranty or liability for your use of this information. Introducing [facility]! Tracy Miner introduces [employer] patient portal. Now you can access parts of your medical record, email your doctor's office, and request medication refills online. 1. In your internet browser, go to [email or web address] 2. Click on the First Time User? Click Here link in the Sign In box. You will see the New Member Sign Up page. 3. Enter your [employer] Access Code exactly as it appears below. You will not need to use this code after youve completed the sign-up process. If you do not sign up before the expiration date, you must request a new code. · [employer] Access Code: [email or web address] Expires: 10/11/2017  2:43 PM 
 
4. Enter the last four digits of your Social Security Number (xxxx) and Date of Birth (mm/dd/yyyy) as indicated and click Submit. You will be taken to the next sign-up page. 5. Create a [employer] ID. This will be your [employer] login ID and cannot be changed, so think of one that is secure and easy to remember. 6. Create a [employer] password. You can change your password at any time. 7. Enter your Password Reset Question and Answer. This can be used at a later time if you forget your password. 8. Enter your e-mail address. You will receive e-mail notification when new information is available in [address] 9. Click Sign Up. You can now view and download portions of your medical record. 10. Click the Download Summary menu link to download a portable copy of your medical information. If you have questions, please visit the Frequently Asked Questions section of the [employer] website. Remember, [employer] is NOT to be used for urgent needs. For medical emergencies, dial 911. Now available from your iPhone and Android! Please provide this summary of care documentation to your next provider. Your primary care clinician is listed as Rubi Pollack. If you have any questions after today's visit, please call [phone].

## 2017-08-22 NOTE — PROGRESS NOTES
AMBULATORY PROGRESS NOTE      Patient: Bhanu Vela             MRN: [id number]     SSN: [id number] Body mass index is 22.73 kg/(m^2). YOB: 1995     AGE: 25 y.o. EX: male    PCP: Radha Alvarado, DO     IMPRESSION/DIAGNOSIS /// TREATMENT PLAN /// HPI AND EXAMINATION       DIAGNOSES  1. Achilles tendon rupture, right, subsequent encounter    2. Post-operative state    3. S/P Achilles tendon repair        No orders of the defined types were placed in this encounter. Bhanu Vela understands his diagnoses and the proposed plan. 1.  8 WEEK RTO// PT CONTINUE  2. WORK NOTE: NOT LIFTING FLOOR-CHAIR OVER 25 LBS       Chris Martinez IS A 25 y.o. male who presents to my outpatient office for evaluation of:     Robi Lorenzo is here for followup. He is many months status post Achilles tendon repair, for which this surgery was performed in April 2017. He has been receiving formal physical therapy and walking and standing and doing quite well. He works at [employer] with some lifting restrictions. He lives in a two-story home. He is able to go up and down steps without any major difficulties. He has a few more formal physical therapy supervised sessions remaining. He reports his discomfort is about 3/10 at the end of the day in terms of mainly general soreness, but otherwise, he states he is doing better. 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     In any event, my plan is to see him in two months time. Overall, he states he is doing better. He walks on a treadmill, and he is able to run on a treadmill, 10-minute mile pace without any major discomfort at all. The physical therapy session he had last was a few days ago.   There is a comment that he would still benefit from ankle mobility to improve some of his dorsiflexion, but overall, he had been improving. ANKLE/FOOT right    Psychiatry: Alert, Oriented x 3; Speech normal in context and clarity,            Memory intact grossly, no involuntary movements - tremors, no dementia  Gait: SLIGHT LIMP  Tenderness: no tender achilles tendon without deformity palpable:      WELL HEALED SURGICAL SCAR   Calf tenderness: Absent for calf or gastrocnemius muscle regions   Soft, supple, non tender, non taut lower extremity compartments   NONE to Medial Malleolar, 4/5 Met base midfoot, achilles, tib post, or   NONE to syndesmosis. no to plantar fascia, or central calcaneal region  Cutaneous: WNL, Joint Motion: WNL   Joint / Tendon Stability: No Ankle or Subtalar instability or joint laxity. No peroneal sublux ability or dislocation  Alignment: neutral Hindfoot, none Metatarsus Adductus Metatarsus. Neuro Motor/Sensory: NL/NL, Vascular: NL foot/ankle pulses  Lymphatics: No extremity lymphedema, No calf swelling, no tenderness to calf muscles. CHART REVIEW     Past Medical History:   Diagnosis Date    Orthodontics braces     Current Outpatient Prescriptions   Medication Sig    HYDROcodone-acetaminophen (NORCO) 5-325 mg per tablet     oxyCODONE-acetaminophen (PERCOCET) 7.5-325 mg per tablet TAKE ONE TO TWO TABLETS BY MOUTH Q 4 - 6 HRS AS NEEDED FOR PAIN **AFTER SURGERY** DO NOT TAKE BEFORE SURGERY  Indications: Pain    aspirin (ASPIRIN) 325 mg tablet Take 1 Tab by mouth daily. **START TAKING AFTER SURGERY**    promethazine (PHENERGAN) 25 mg tablet Take 1 Tab by mouth every six (6) hours as needed for Nausea.  polyethylene glycol (MIRALAX) 17 gram packet Take 1 Packet by mouth daily.  acetaminophen (TYLENOL) 325 mg tablet Take  by mouth every four (4) hours as needed for Pain. No current facility-administered medications for this visit.       No Known Allergies  Past Surgical History:   Procedure Laterality Date    HAND/FINGER SURGERY UNLISTED      HX ANKLE FRACTURE TX      HX ORTHOPAEDIC      finger    HX TONSIL AND ADENOIDECTOMY  2004     Social History     Occupational History    Not on file. Social History Main Topics    Smoking status: Never Smoker    Smokeless tobacco: Never Used    Alcohol use Yes      Comment: ocassional     Drug use: Yes     Special: Marijuana      Comment: last use was 3/10/17    Sexual activity: Not on file     Family History   Problem Relation Age of Onset    No Known Problems Mother     No Known Problems Father     Diabetes Maternal Grandmother         REVIEW OF SYSTEMS : 8/22/2017  ALL BELOW ARE Negative except : SEE HPI     Constitutional: Negative for fever, chills and weight loss. Neg Weight Loss  Cardiovascular: Negative for chest pain, claudication and leg swelling. SOB, CARRASCO   Gastrointestinal/Urological: Negative for pain, N/V/D/C, Blood in stool or urine,dysuria,  Hematuria, Incontinence  Musculoskeletal: see HPI. Neurological: Negative for dizziness and weakness, headaches,Visual Changes or   Confusion, or Seizures,   Psychiatric/Behavioral: Negative for depression, memory loss and substance abuse. Extremities:  Negative for hair changes, rash or skin lesion changes. Hematologic: Negative for Bleeding problems, bruising, pallor or swollen lymph nodes. Peripheral Vascular: No calf pain, vascular vein tenderness to calf pain              No calf throbbing, posterior knee throbbing pain     DIAGNOSTIC IMAGING     Please see above section of this report. I have personally reviewed the results of the above study. The interpretation of this study is my professional opinion.       Elisa Watson MD  8/22/2017  9:05 AM

## 2017-08-22 NOTE — LETTER
NOTIFICATION RETURN TO WORK / SCHOOL 
 
8/22/2017 9:00 AM 
 
Mr. Shwetha Mercado [address] [address] To Whom It May Concern: Shwetha Mercado is currently under the care of [address] [place] [address] [facility] [place]. He will return to work on 8-22-17 with the following restrictions: 
 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 No lifting from floor to chest greater than 25 pounds. Patient will follow up in 2 months. If there are questions or concerns please have the patient contact our office. Sincerely, Kenan Wiley PA-C

## 2017-08-22 NOTE — PATIENT INSTRUCTIONS
Continue with PT  Follow up with Dr. Vanesa Duncan as directed       Achilles Tendon: Exercises  Your Care Instructions  Here are some examples of exercises for your achilles tendon. Start each exercise slowly. Ease off the exercise if you start to have pain. Your doctor or physical therapist will tell you when you can start these exercises and which ones will work best for you. How to do the exercises  Toe stretch    1. Sit in a chair, and extend your affected leg so that your heel is on the floor. 2. With your hand, reach down and pull your big toe up and back. Pull toward your ankle and away from the floor. 3. Hold the position for at least 15 to 30 seconds. 4. Repeat 2 to 4 times a session, several times a day. Calf-plantar fascia stretch    1. Sit with your legs extended and knees straight. 2. Place a towel around your foot just under the toes. 3. Hold each end of the towel in each hand, with your hands above your knees. 4. Pull back with the towel so that your foot stretches toward you. 5. Hold the position for at least 15 to 30 seconds. 6. Repeat 2 to 4 times a session, up to 5 sessions a day. Floor stretch    1. Stand about 2 feet from a wall, and place your hands on the wall at about shoulder height. Or you can stand behind a chair, placing your hands on the back of it for balance. 2. Step back with the leg you want to stretch. Keep the leg straight, and press your heel into the floor with your toe turned slightly in.  3. Lean forward, and bend your other leg slightly. Feel the stretch in the Achilles tendon of your back leg. Hold for at least 15 to 30 seconds. 4. Repeat 2 to 4 times a session, up to 5 sessions a day. Stair stretch    1. Stand with the balls of both feet on the edge of a step or curb (or a medium-sized phone book). With at least one hand, hold onto something solid for balance, such as a banister or handrail.   2. Keeping your affected leg straight, slowly let that heel hang down off of the step or curb until you feel a stretch in the back of your calf and/or Achilles area. Some of your weight should still be on the other leg. 3. Hold this position for at least 15 to 30 seconds. 4. Repeat 2 to 4 times a session, up to 5 times a day or whenever your Achilles tendon starts to feel tight. This stretch can also be done with your knee slightly bent. Strength exercise    This exercise will get you started on building strength after an Achilles tendon injury. Your doctor or physical therapist can help you move on to more challenging exercises as you heal and get stronger. 1. Stand on a step with your heel off the edge of the step. Hold on to a handrail or wall for balance. 2. Push up on your toes, then slowly count to 10 as you lower yourself back down until your heel is below the step. If it hurts to push up on your toes, try putting most of your weight on your other foot as you push up, or try using your arms to help you. If you can't do this exercise without causing pain, stop the exercise and talk to your doctor. 3. Repeat the exercise 8 to 12 times, half with the knee straight and half with the knee bent. Follow-up care is a key part of your treatment and safety. Be sure to make and go to all appointments, and call your doctor if you are having problems. It's also a good idea to know your test results and keep a list of the medicines you take. Where can you learn more? Go to http://linh-nuha.info/. Enter M930 in the search box to learn more about \"Achilles Tendon: Exercises. \"  Current as of: March 21, 2017  Content Version: 11.3  © 5307-4955 [employer]. Care instructions adapted under license by [employer] (which disclaims liability or warranty for this information).  If you have questions about a medical condition or this instruction, always ask your healthcare professional. [address]  any warranty or liability for your use of this information.

## 2017-08-23 ENCOUNTER — HOSPITAL ENCOUNTER (OUTPATIENT)
Dept: PHYSICAL THERAPY | Age: 22
Discharge: HOME | End: 2017-08-23
Payer: COMMERCIAL

## 2017-08-23 PROCEDURE — 97530 THERAPEUTIC ACTIVITIES: CPT

## 2017-08-23 PROCEDURE — 97110 THERAPEUTIC EXERCISES: CPT

## 2017-08-28 ENCOUNTER — HOSPITAL ENCOUNTER (OUTPATIENT)
Dept: PHYSICAL THERAPY | Age: 22
Discharge: HOME | End: 2017-08-28
Payer: COMMERCIAL

## 2017-08-28 PROCEDURE — 97110 THERAPEUTIC EXERCISES: CPT

## 2017-08-28 PROCEDURE — 97530 THERAPEUTIC ACTIVITIES: CPT

## 2017-08-28 NOTE — PROGRESS NOTES
PT DAILY TREATMENT NOTE - [facility]     Patient Name: Marie Shcaefer  Date:2017  : 1995  [x]  Patient  Verified  Payor: BLUE CROSS / Plan: [employer] [facility] / Product Type: PPO /    In time:9:10  Out time:9:56  Total Treatment Time (min):43  Visit #: 9 of 10    Treatment Area: Pain in right ankle and joints of right foot [M25.571]    SUBJECTIVE  Pain Level (0-10 scale): 2  Any medication changes, allergies to medications, adverse drug reactions, diagnosis change, or new procedure performed?: [x] No    [] Yes (see summary sheet for update)  Subjective functional status/changes:   [] No changes reported  Little  Pain.     OBJECTIVE    Modality rationale: decrease edema, decrease inflammation, decrease pain and increase tissue extensibility to improve the patients ability to perform ADL    Min Type Additional Details    [] Estim:  []Unatt       []IFC  []Premod                        []Other:  []w/ice   []w/heat  Position:  Location:    [] Estim: []Att    []TENS instruct  []NMES                    []Other:  []w/US   []w/ice   []w/heat  Position:  Location:    []  Traction: [] Cervical       []Lumbar                       [] Prone          []Supine                       []Intermittent   []Continuous Lbs:  [] before manual  [] after manual    []  Ultrasound: []Continuous   [] Pulsed                           []1MHz   []3MHz W/cm2:  Location:    []  Iontophoresis with dexamethasone         Location: [] Take home patch   [] In clinic   10 [x]  Ice  post   []  heat  []  Ice massage  []  Laser   []  Anodyne Position:long sit  Location:(R)  foot    []  Laser with stim  []  Other:  Position:  Location:    []  Vasopneumatic Device Pressure:       [] lo [] med [] hi   Temperature: [] lo [] med [] hi   [x] Skin assessment post-treatment:  [x]intact []redness- no adverse reaction    []redness  adverse reaction:      min []Eval                  []Re-Eval       23 min Therapeutic Exercise:  [x] See flow sheet : Rationale: increase ROM and increase strength to improve the patients ability to perform ADL     10 min Therapeutic Activity:  []  See flow sheet :   Rationale:   to improve the patients ability to       min Neuromuscular Re-education:  []  See flow sheet :   Rationale:   to improve the patients ability to      min Manual Therapy:     Rationale: decrease pain, increase ROM, increase tissue extensibility and decrease edema  to perform ADL      min Gait Training:  ___ feet with ___ device on level surfaces with ___ level of assist   Rationale: With   [x] TE   [] TA   [] neuro   [] other: Patient Education: [x] Review HEP    [] Progressed/Changed HEP based on:   [] positioning   [] body mechanics   [] transfers   [] heat/ice application    [] other:      Other Objective/Functional Measures:      Pain Level (0-10 scale) post treatment:     ASSESSMENT/Changes in Function:     Patient will continue to benefit from skilled PT services to address functional mobility deficits, address ROM deficits, address strength deficits, analyze and address soft tissue restrictions and instruct in home and community integration to attain remaining goals. [x]  See Plan of Care  []  See progress note/recertification  []  See Discharge Summary         Progress towards goals / Updated goals:  1. Pt will Amb on TM 1/2mile w/o increased s/s  PN TM . 19 miles at 2.5 MPH  5 minutes  7/20/17    Ran  .80 miles [facility] [place]  8/16/17  Current Status:   1 mile    1min  3  Sec  8/23/17  2.  Pt will have (R) foot AROM DF to 5* or better for good ankle mobility for a normal gait pattern  PN Progressing at +15  8/21/17  Current Status:   PLAN  []  Upgrade activities as tolerated     [x]  Continue plan of care  []  Update interventions per flow sheet       []  Discharge due to:_  []  Other:_      Maggie Negron, PTA 8/28/2017  10:22 AM    Future Appointments  Date Time Provider Jailyn Fernando   8/30/2017 9:00 AM [facility] 10/23/2017 7:30 AM Boyle MD Mirella Chin Adonis 71

## 2017-08-30 ENCOUNTER — HOSPITAL ENCOUNTER (OUTPATIENT)
Dept: PHYSICAL THERAPY | Age: 22
Discharge: HOME | End: 2017-08-30
Payer: COMMERCIAL

## 2017-08-30 PROCEDURE — 97110 THERAPEUTIC EXERCISES: CPT

## 2017-08-30 PROCEDURE — 97530 THERAPEUTIC ACTIVITIES: CPT

## 2017-08-30 NOTE — PROGRESS NOTES
PT DISCHARGE DAILY NOTE AND [id number]    Date:2017  Patient name: Chi Snyder Start of Care: 17   Referral source: Leigh Li MD : 1995   Medical/Treatment Diagnosis: Pain in right ankle and joints of right foot [M25.571] Onset Date:17 followed by surgery 17    Prior Hospitalization: see medical history Provider#: [id number]   Medications: Verified on Patient Summary List    Comorbidities: none  Prior Level of Function:Able to play basketball and hang out with friends w/o (R) angle pain, Walk w/o crutches    Visits from Start of Care: 30    Missed Visits: 1    Reporting Period : 17 to 17    [x]  Patient  Verified  Payor: BLUE CROSS / Plan: [employer] / Product Type: PPO /    In time:902  Out time:1002  Total Treatment Time (min): 48  Visit #: 10 of 10    SUBJECTIVE  Pain Level (0-10 scale): 1-2  Any medication changes, allergies to medications, adverse drug reactions, diagnosis change, or new procedure performed?: [x] No    [] Yes (see summary sheet for update)  Subjective functional status/changes:   [] No changes reported  Doing ok    OBJECTIVE    Modality rationale: decrease pain and increase tissue extensibility to improve the patients ability to aid with increase tolerance to ADLs   Min Type Additional Details    [] Estim:  []Unatt       []IFC  []Premod                        []Other:  []w/ice   []w/heat  Position:  Location:    [] Estim: []Att    []TENS instruct  []NMES                    []Other:  []w/US   []w/ice   []w/heat  Position:  Location:    []  Traction: [] Cervical       []Lumbar                       [] Prone          []Supine                       []Intermittent   []Continuous Lbs:  [] before manual  [] after manual    []  Ultrasound: []Continuous   [] Pulsed                           []1MHz   []3MHz W/cm2:  Location:    []  Iontophoresis with dexamethasone         Location: [] Take home patch   [] In clinic   10 [x]  Ice pain    [] heat  []  Ice massage  []  Laser   []  Anodyne Position:longsit  Location:R ankle    []  Laser with stim  []  Other:  Position:  Location:    []  Vasopneumatic Device Pressure:       [] lo [] med [] hi   Temperature: [] lo [] med [] hi   [] Skin assessment post-treatment:  []intact []redness- no adverse reaction    []redness  adverse reaction:       min []Eval                  []Re-Eval       23 min Therapeutic Exercise:  [x] See flow sheet :   Rationale: increase ROM, increase strength, improve coordination, improve balance and increase proprioception to improve the patients ability to tolerate activities    15 min Therapeutic Activity:  []  See flow sheet :TM running   Rationale: improve coordination, improve balance and increase proprioception  to improve the patients ability to aid with increase tolerance to ALDS and activities      min Neuromuscular Re-education:  []  See flow sheet :   Rationale:   to improve the patients ability to      min Manual Therapy:     Rationale:  to      min Gait Training:  ___ feet with ___ device on level surfaces with ___ level of assist   Rationale:           With   [] TE   [] TA   [] neuro   [] other: Patient Education: [x] Review HEP    [] Progressed/Changed HEP based on:   [] positioning   [] body mechanics   [] transfers   [] heat/ice application    [x] other: Discharge instructions     Other Objective/Functional Measures: MMT 5/5 PF Df I E        AROM DF -5 N in longsitting    Pain Level (0-10 scale) post treatment:0    Summary of Care:  Goal:Pt will Amb on TM 1/2mile w/o increased s/s  Status at last note/certification: last note  Status at discharge: met,   1.04 mile 10:05 minutes       Goal:Pt will have (R) foot AROM DF to 5* or better for good ankle mobility for a normal gait pattern  Status at last note/certification:last note  Status at discharge: not met, -5 N today in longsitting       ASSESSMENT/Changes in Function: patient is now able to attempt self management with HEP . Tband issued .  He sees his MD for follow up in 2 months    Thank you for this referral!      PLAN  [x]Discontinue therapy: [x]Patient has reached or is progressing toward set goals      []Patient is non-compliant or has abdicated      []Due to lack of appreciable progress towards set goals    Yolette Up, PT 8/30/2017  9:16 AM